# Patient Record
Sex: MALE | Race: WHITE | NOT HISPANIC OR LATINO | Employment: OTHER | URBAN - NONMETROPOLITAN AREA
[De-identification: names, ages, dates, MRNs, and addresses within clinical notes are randomized per-mention and may not be internally consistent; named-entity substitution may affect disease eponyms.]

---

## 2024-09-30 ENCOUNTER — APPOINTMENT (OUTPATIENT)
Dept: GENERAL RADIOLOGY | Facility: HOSPITAL | Age: 79
End: 2024-09-30
Payer: MEDICARE

## 2024-09-30 ENCOUNTER — HOSPITAL ENCOUNTER (INPATIENT)
Facility: HOSPITAL | Age: 79
LOS: 2 days | Discharge: HOME | End: 2024-10-02
Attending: EMERGENCY MEDICINE | Admitting: STUDENT IN AN ORGANIZED HEALTH CARE EDUCATION/TRAINING PROGRAM
Payer: MEDICARE

## 2024-09-30 DIAGNOSIS — R00.1 SYMPTOMATIC BRADYCARDIA: Primary | ICD-10-CM

## 2024-09-30 DIAGNOSIS — I44.2 COMPLETE HEART BLOCK: ICD-10-CM

## 2024-09-30 DIAGNOSIS — I45.9 HEART BLOCK: ICD-10-CM

## 2024-09-30 DIAGNOSIS — G89.18 POST-OP PAIN: ICD-10-CM

## 2024-09-30 LAB
ALBUMIN SERPL-MCNC: 4 G/DL (ref 3.5–5.2)
ALBUMIN/GLOB SERPL: 1.3 G/DL
ALP SERPL-CCNC: 73 U/L (ref 39–117)
ALT SERPL W P-5'-P-CCNC: 22 U/L (ref 1–41)
ANION GAP SERPL CALCULATED.3IONS-SCNC: 10 MMOL/L (ref 5–15)
APTT PPP: 25.9 SECONDS (ref 24.5–36)
AST SERPL-CCNC: 22 U/L (ref 1–40)
BASOPHILS # BLD AUTO: 0.03 10*3/MM3 (ref 0–0.2)
BASOPHILS NFR BLD AUTO: 0.3 % (ref 0–1.5)
BILIRUB SERPL-MCNC: 0.6 MG/DL (ref 0–1.2)
BUN SERPL-MCNC: 17 MG/DL (ref 8–23)
BUN/CREAT SERPL: 20.5 (ref 7–25)
CALCIUM SPEC-SCNC: 9.4 MG/DL (ref 8.6–10.5)
CHLORIDE SERPL-SCNC: 104 MMOL/L (ref 98–107)
CO2 SERPL-SCNC: 26 MMOL/L (ref 22–29)
CREAT SERPL-MCNC: 0.83 MG/DL (ref 0.76–1.27)
DEPRECATED RDW RBC AUTO: 50.4 FL (ref 37–54)
EGFRCR SERPLBLD CKD-EPI 2021: 90.1 ML/MIN/1.73
EOSINOPHIL # BLD AUTO: 0.08 10*3/MM3 (ref 0–0.4)
EOSINOPHIL NFR BLD AUTO: 0.9 % (ref 0.3–6.2)
ERYTHROCYTE [DISTWIDTH] IN BLOOD BY AUTOMATED COUNT: 14.8 % (ref 12.3–15.4)
GLOBULIN UR ELPH-MCNC: 3.1 GM/DL
GLUCOSE SERPL-MCNC: 99 MG/DL (ref 65–99)
HCT VFR BLD AUTO: 45 % (ref 37.5–51)
HGB BLD-MCNC: 15.2 G/DL (ref 13–17.7)
HOLD SPECIMEN: NORMAL
IMM GRANULOCYTES # BLD AUTO: 0.03 10*3/MM3 (ref 0–0.05)
IMM GRANULOCYTES NFR BLD AUTO: 0.3 % (ref 0–0.5)
INR PPP: 0.95 (ref 0.91–1.09)
LYMPHOCYTES # BLD AUTO: 2.7 10*3/MM3 (ref 0.7–3.1)
LYMPHOCYTES NFR BLD AUTO: 31.4 % (ref 19.6–45.3)
MCH RBC QN AUTO: 31.3 PG (ref 26.6–33)
MCHC RBC AUTO-ENTMCNC: 33.8 G/DL (ref 31.5–35.7)
MCV RBC AUTO: 92.6 FL (ref 79–97)
MONOCYTES # BLD AUTO: 0.7 10*3/MM3 (ref 0.1–0.9)
MONOCYTES NFR BLD AUTO: 8.1 % (ref 5–12)
NEUTROPHILS NFR BLD AUTO: 5.07 10*3/MM3 (ref 1.7–7)
NEUTROPHILS NFR BLD AUTO: 59 % (ref 42.7–76)
NRBC BLD AUTO-RTO: 0 /100 WBC (ref 0–0.2)
PLATELET # BLD AUTO: 144 10*3/MM3 (ref 140–450)
PMV BLD AUTO: 9.6 FL (ref 6–12)
POTASSIUM SERPL-SCNC: 4.1 MMOL/L (ref 3.5–5.2)
PROT SERPL-MCNC: 7.1 G/DL (ref 6–8.5)
PROTHROMBIN TIME: 13.1 SECONDS (ref 11.8–14.8)
QT INTERVAL: 506 MS
QTC INTERVAL: 402 MS
RBC # BLD AUTO: 4.86 10*6/MM3 (ref 4.14–5.8)
SODIUM SERPL-SCNC: 140 MMOL/L (ref 136–145)
TROPONIN T SERPL HS-MCNC: 24 NG/L
TROPONIN T SERPL HS-MCNC: 25 NG/L
TSH SERPL DL<=0.05 MIU/L-ACNC: 1.9 UIU/ML (ref 0.27–4.2)
WBC NRBC COR # BLD AUTO: 8.61 10*3/MM3 (ref 3.4–10.8)
WHOLE BLOOD HOLD COAG: NORMAL
WHOLE BLOOD HOLD SPECIMEN: NORMAL

## 2024-09-30 PROCEDURE — 36415 COLL VENOUS BLD VENIPUNCTURE: CPT | Performed by: STUDENT IN AN ORGANIZED HEALTH CARE EDUCATION/TRAINING PROGRAM

## 2024-09-30 PROCEDURE — 85025 COMPLETE CBC W/AUTO DIFF WBC: CPT | Performed by: EMERGENCY MEDICINE

## 2024-09-30 PROCEDURE — C1894 INTRO/SHEATH, NON-LASER: HCPCS | Performed by: STUDENT IN AN ORGANIZED HEALTH CARE EDUCATION/TRAINING PROGRAM

## 2024-09-30 PROCEDURE — 71045 X-RAY EXAM CHEST 1 VIEW: CPT

## 2024-09-30 PROCEDURE — 25010000002 FENTANYL CITRATE (PF) 50 MCG/ML SOLUTION: Performed by: STUDENT IN AN ORGANIZED HEALTH CARE EDUCATION/TRAINING PROGRAM

## 2024-09-30 PROCEDURE — 99285 EMERGENCY DEPT VISIT HI MDM: CPT

## 2024-09-30 PROCEDURE — 99223 1ST HOSP IP/OBS HIGH 75: CPT | Performed by: STUDENT IN AN ORGANIZED HEALTH CARE EDUCATION/TRAINING PROGRAM

## 2024-09-30 PROCEDURE — 33210 INSERT ELECTRD/PM CATH SNGL: CPT | Performed by: STUDENT IN AN ORGANIZED HEALTH CARE EDUCATION/TRAINING PROGRAM

## 2024-09-30 PROCEDURE — 84443 ASSAY THYROID STIM HORMONE: CPT | Performed by: EMERGENCY MEDICINE

## 2024-09-30 PROCEDURE — 84484 ASSAY OF TROPONIN QUANT: CPT | Performed by: STUDENT IN AN ORGANIZED HEALTH CARE EDUCATION/TRAINING PROGRAM

## 2024-09-30 PROCEDURE — 93005 ELECTROCARDIOGRAM TRACING: CPT | Performed by: EMERGENCY MEDICINE

## 2024-09-30 PROCEDURE — 25010000002 MIDAZOLAM HCL (PF) 5 MG/5ML SOLUTION: Performed by: STUDENT IN AN ORGANIZED HEALTH CARE EDUCATION/TRAINING PROGRAM

## 2024-09-30 PROCEDURE — 85730 THROMBOPLASTIN TIME PARTIAL: CPT | Performed by: EMERGENCY MEDICINE

## 2024-09-30 PROCEDURE — 85610 PROTHROMBIN TIME: CPT | Performed by: EMERGENCY MEDICINE

## 2024-09-30 PROCEDURE — 5A1223Z PERFORMANCE OF CARDIAC PACING, CONTINUOUS: ICD-10-PCS | Performed by: STUDENT IN AN ORGANIZED HEALTH CARE EDUCATION/TRAINING PROGRAM

## 2024-09-30 PROCEDURE — 80053 COMPREHEN METABOLIC PANEL: CPT | Performed by: EMERGENCY MEDICINE

## 2024-09-30 PROCEDURE — 84484 ASSAY OF TROPONIN QUANT: CPT | Performed by: EMERGENCY MEDICINE

## 2024-09-30 PROCEDURE — 93005 ELECTROCARDIOGRAM TRACING: CPT

## 2024-09-30 PROCEDURE — 93005 ELECTROCARDIOGRAM TRACING: CPT | Performed by: STUDENT IN AN ORGANIZED HEALTH CARE EDUCATION/TRAINING PROGRAM

## 2024-09-30 RX ORDER — LOSARTAN POTASSIUM 25 MG/1
25 TABLET ORAL DAILY
COMMUNITY

## 2024-09-30 RX ORDER — ASPIRIN 81 MG/1
81 TABLET ORAL DAILY
Status: DISCONTINUED | OUTPATIENT
Start: 2024-09-30 | End: 2024-10-02 | Stop reason: HOSPADM

## 2024-09-30 RX ORDER — FENTANYL CITRATE 50 UG/ML
INJECTION, SOLUTION INTRAMUSCULAR; INTRAVENOUS
Status: DISCONTINUED | OUTPATIENT
Start: 2024-09-30 | End: 2024-09-30 | Stop reason: HOSPADM

## 2024-09-30 RX ORDER — ASPIRIN 81 MG/1
81 TABLET, CHEWABLE ORAL DAILY
COMMUNITY

## 2024-09-30 RX ORDER — MIDAZOLAM HYDROCHLORIDE 5 MG/5ML
INJECTION, SOLUTION INTRAMUSCULAR; INTRAVENOUS
Status: DISCONTINUED | OUTPATIENT
Start: 2024-09-30 | End: 2024-09-30 | Stop reason: HOSPADM

## 2024-09-30 RX ORDER — LIDOCAINE HYDROCHLORIDE 20 MG/ML
INJECTION, SOLUTION INFILTRATION; PERINEURAL
Status: DISCONTINUED | OUTPATIENT
Start: 2024-09-30 | End: 2024-09-30 | Stop reason: HOSPADM

## 2024-09-30 RX ADMIN — ASPIRIN 81 MG: 81 TABLET, COATED ORAL at 20:04

## 2024-09-30 NOTE — Clinical Note
A sheath was successfully inserted using micropuncture technique with ultrasound guidance into the left subclavian vein. Sheath insertion not delayed. Sheath insertion delayed. Wire retained

## 2024-09-30 NOTE — Clinical Note
A 6 fr sheath was successfully inserted with ultrasound guidance into the right internal jugular vein. Sheath insertion not delayed.

## 2024-09-30 NOTE — Clinical Note
Level of Care: Telemetry [5]   Diagnosis: Complete heart block [097016]   Admitting Physician: SAMEERA BURNS [[id number]]   Attending Physician: SAMEERA BURNS [[id number]]   Isolate for COVID?: No [0]   Certification: I Certify That Inpatient Hospital Services Are Medically Necessary For Greater Than 2 Midnights

## 2024-09-30 NOTE — INTERVAL H&P NOTE
H&P reviewed. The patient was examined and there are no changes to the H&P.      ASA: 4  Mallampati: 3

## 2024-09-30 NOTE — H&P (VIEW-ONLY)
"EP CONSULT NOTE    Subjective        History of Present Illness    EP Problems:  1.  Complete heart block  2.  Sinus node dysfunction    Cardiology Problems:  1.  Renal artery aneurysm  2.  CAD status post PCI  3.  Hypertension  4.  Hyperlipidemia    Medical Problems:  1.  BPH  2.  Hypothyroidism  3.  COPD  4.  Obesity    Ej Almanza is a 78 y.o. male with problem list as above for whom EP is consulted regarding complete heart block.  He recently wore Holter monitor for symptoms of worsening lightheadedness, weakness, fatigue, shortness of breath.  He was found to have complete heart block on the study.  He was advised to come to the ER for evaluation.  In the ER, he was noted to be in complete heart block with a wide ventricular escape.  Given these findings, EP was consulted for further evaluation.    Objective   Vital Signs:  /75   Pulse (!) 39   Temp 98.5 °F (36.9 °C) (Oral)   Resp 14   Ht 182.9 cm (72\")   Wt 107 kg (236 lb 8 oz)   SpO2 95%   BMI 32.08 kg/m²   Estimated body mass index is 32.08 kg/m² as calculated from the following:    Height as of this encounter: 182.9 cm (72\").    Weight as of this encounter: 107 kg (236 lb 8 oz).      Physical Exam  Vitals reviewed.   Constitutional:       Appearance: Normal appearance.   Cardiovascular:      Rate and Rhythm: Bradycardia present. Rhythm irregular.      Pulses: Normal pulses.      Heart sounds: Normal heart sounds.   Pulmonary:      Effort: Pulmonary effort is normal.      Breath sounds: Normal breath sounds.   Musculoskeletal:         General: No swelling.   Neurological:      Mental Status: He is alert and oriented to person, place, and time.   Psychiatric:         Mood and Affect: Mood normal.         Judgment: Judgment normal.          Result Review :  The following data was reviewed by: Bianca Espinal MD on 09/30/2024:  CMP          9/30/2024    13:21   CMP   Glucose 99    BUN 17    Creatinine 0.83    EGFR 90.1    Sodium 140  "   Potassium 4.1    Chloride 104    Calcium 9.4    Total Protein 7.1    Albumin 4.0    Globulin 3.1    Total Bilirubin 0.6    Alkaline Phosphatase 73    AST (SGOT) 22    ALT (SGPT) 22    Albumin/Globulin Ratio 1.3    BUN/Creatinine Ratio 20.5    Anion Gap 10.0      CBC          5/22/2024    06:40 9/3/2024    16:22 9/30/2024    13:21   CBC   WBC 6.8     8.7     8.61    RBC 4.93     4.74     4.86    Hemoglobin 15.2     14.5     15.2    Hematocrit 44.3     43.3     45.0    MCV 89.9     91.4     92.6    MCH 30.8     30.6     31.3    MCHC 34.3     33.5     33.8    RDW 14.7     14.6     14.8    Platelets 165     144     144       Details          This result is from an external source.             TSH          9/30/2024    13:21   TSH   TSH 1.900        Chest x-ray from today: Lungs are clear bilaterally    ECG today: Sinus rhythm, occasional PACs, A-V dissociation with complete heart block, fluctuation in ventricular escape rhythm         Assessment and Plan   Problems:  Complete heart block  Sinus node dysfunction    Ej Almanza is a 78 y.o. male with problem list as above for whom EP is consulted regarding complete heart block, sinus node dysfunction.  He has evidence of both complete heart block and sinus node dysfunction.  No obvious reversible etiology.  Given these findings, a pacemaker is going to be necessary for prevention of worsening symptoms and possible sudden death.  It is not going to be feasible to do this today.  As such, we will plan to put in a temporary pacemaker with plans for permanent pacemaker implant as early as tomorrow.    Plan:  -N.p.o. for temporary pacemaker implant today  -Plan for permanent pacemaker implant later this hospitalization  -ICU admission given temporary pacemaker                  Part of this note may be an electronic transcription/translation of spoken language to printed text using the Dragon Dictation System.

## 2024-09-30 NOTE — ED PROVIDER NOTES
Subjective   History of Present Illness  Patient is a 78-year-old male with a history of coronary artery disease who presents to the ER with generalized weakness and bradycardia.  Patient states he has had generalized weakness for the last several months, progressively worsening.  Patient saw Dr. Stoll and was found to have bradycardia.  He wore a heart monitor for a few weeks and was found to have intermittent third-degree block and an AV block.  Dr. Stoll saw the patient in clinic this morning and sent him here to be admitted for pacemaker.  Patient states he does have some shortness of air with exertion.  He denies any fever, chest pain, abdominal pain, nausea vomiting diarrhea, urinary changes, neurologic changes, cough, syncope.      Review of Systems   HENT: Negative.     Eyes: Negative.    Respiratory:  Positive for shortness of breath.    Cardiovascular: Negative.    Gastrointestinal: Negative.    Endocrine: Negative.    Genitourinary: Negative.    Musculoskeletal: Negative.    Skin: Negative.    Allergic/Immunologic: Negative.    Neurological:  Positive for weakness.   Hematological: Negative.    Psychiatric/Behavioral: Negative.     All other systems reviewed and are negative.      No past medical history on file.    Allergies   Allergen Reactions    Penicillins Hives       No past surgical history on file.    No family history on file.    Social History     Socioeconomic History    Marital status:            Objective   Physical Exam  Vitals and nursing note reviewed.   Constitutional:       Appearance: He is well-developed.   HENT:      Head: Normocephalic and atraumatic.   Eyes:      Conjunctiva/sclera: Conjunctivae normal.      Pupils: Pupils are equal, round, and reactive to light.   Cardiovascular:      Rate and Rhythm: Regular rhythm. Bradycardia present.      Heart sounds: Normal heart sounds.   Pulmonary:      Effort: Pulmonary effort is normal.      Breath sounds: Normal breath sounds.    Abdominal:      Palpations: Abdomen is soft.      Tenderness: There is no abdominal tenderness.   Musculoskeletal:         General: No deformity. Normal range of motion.      Cervical back: Normal range of motion.   Skin:     General: Skin is warm.   Neurological:      Mental Status: He is alert and oriented to person, place, and time.      Sensory: Sensation is intact.      Motor: Motor function is intact.   Psychiatric:         Behavior: Behavior normal.         Procedures           ED Course      EKG as interpreted by me: Idioventricular AV block with PVCs with bradycardia with a rate of 38        [id number] Score: 2                          Lab Results (last 24 hours)       Procedure Component Value Units Date/Time    CBC & Differential [[id number]]  (Normal) Collected: 09/30/24 1321    Specimen: Blood Updated: 09/30/24 1328    Narrative:      The following orders were created for panel order CBC & Differential.  Procedure                               Abnormality         Status                     ---------                               -----------         ------                     CBC Auto Differential[[id number]]        Normal              Final result                 Please view results for these tests on the individual orders.    Comprehensive Metabolic Panel [[id number]] Collected: 09/30/24 1321    Specimen: Blood Updated: 09/30/24 1351     Glucose 99 mg/dL      BUN 17 mg/dL      Creatinine 0.83 mg/dL      Sodium 140 mmol/L      Potassium 4.1 mmol/L      Chloride 104 mmol/L      CO2 26.0 mmol/L      Calcium 9.4 mg/dL      Total Protein 7.1 g/dL      Albumin 4.0 g/dL      ALT (SGPT) 22 U/L      AST (SGOT) 22 U/L      Alkaline Phosphatase 73 U/L      Total Bilirubin 0.6 mg/dL      Globulin 3.1 gm/dL      A/G Ratio 1.3 g/dL      BUN/Creatinine Ratio 20.5     Anion Gap 10.0 mmol/L      eGFR 90.1 mL/min/1.73     Narrative:      GFR Normal >60  Chronic Kidney Disease <60  Kidney Failure <15    The GFR formula  is only valid for adults with stable renal function between ages 18 and 70.    CBC Auto Differential [[id number]]  (Normal) Collected: 09/30/24 1321    Specimen: Blood Updated: 09/30/24 1328     WBC 8.61 10*3/mm3      RBC 4.86 10*6/mm3      Hemoglobin 15.2 g/dL      Hematocrit 45.0 %      MCV 92.6 fL      MCH 31.3 pg      MCHC 33.8 g/dL      RDW 14.8 %      RDW-SD 50.4 fl      MPV 9.6 fL      Platelets 144 10*3/mm3      Neutrophil % 59.0 %      Lymphocyte % 31.4 %      Monocyte % 8.1 %      Eosinophil % 0.9 %      Basophil % 0.3 %      Immature Grans % 0.3 %      Neutrophils, Absolute 5.07 10*3/mm3      Lymphocytes, Absolute 2.70 10*3/mm3      Monocytes, Absolute 0.70 10*3/mm3      Eosinophils, Absolute 0.08 10*3/mm3      Basophils, Absolute 0.03 10*3/mm3      Immature Grans, Absolute 0.03 10*3/mm3      nRBC 0.0 /100 WBC     Single High Sensitivity Troponin T [[id number]]  (Abnormal) Collected: 09/30/24 1321    Specimen: Blood Updated: 09/30/24 1444     HS Troponin T 24 ng/L     Narrative:      High Sensitive Troponin T Reference Range:  <14.0 ng/L- Negative Female for AMI  <22.0 ng/L- Negative Male for AMI  >=14 - Abnormal Female indicating possible myocardial injury.  >=22 - Abnormal Male indicating possible myocardial injury.   Clinicians would have to utilize clinical acumen, EKG, Troponin, and serial changes to determine if it is an Acute Myocardial Infarction or myocardial injury due to an underlying chronic condition.         Protime-INR [[id number]]  (Normal) Collected: 09/30/24 1321    Specimen: Blood Updated: 09/30/24 1355     Protime 13.1 Seconds      INR 0.95    aPTT [[id number]]  (Normal) Collected: 09/30/24 1321    Specimen: Blood Updated: 09/30/24 1355     PTT 25.9 seconds     TSH [[id number]]  (Normal) Collected: 09/30/24 1321    Specimen: Blood Updated: 09/30/24 1411     TSH 1.900 uIU/mL            XR Chest 1 View   Final Result   1. Haziness at the LEFT lateral lung base. Limited evaluation due to    cardiac pacing pad at the LEFT chest.   2. Prominent cardiac silhouette and suspected enlarged aortic arch.           This report was signed and finalized on 9/30/2024 2:27 PM by Dr Erin Ricardo MD.                     Medical Decision Making  Patient is a 78-year-old male with a history of coronary artery disease who presents to the ER with generalized weakness and bradycardia.  Patient states he has had generalized weakness for the last several months, progressively worsening.  Patient saw Dr. Stoll and was found to have bradycardia.  He wore a heart monitor for a few weeks and was found to have intermittent third-degree block and an AV block.  Dr. Stoll saw the patient in clinic this morning and sent him here to be admitted for pacemaker.  Patient states he does have some shortness of air with exertion.  He denies any fever, chest pain, abdominal pain, nausea vomiting diarrhea, urinary changes, neurologic changes, cough, syncope.    Differential diagnosis: Bradycardia, heart block, electrolyte abnormality    Dr. Espinal with cardiology was consulted.  Labs are unremarkable except for a mildly elevated troponin.  Chest x-ray shows some haziness at the left lateral lung base and a prominent cardiac silhouette and suspected enlarged aortic arch but were otherwise negative.  Dr. Espinal has seen the patient and has admitted the patient to his service for further workup and treatment.  He plans to place a temporary pacer tonight.      Problems Addressed:  Heart block: complicated acute illness or injury  Symptomatic bradycardia: complicated acute illness or injury    Amount and/or Complexity of Data Reviewed  Labs: ordered. Decision-making details documented in ED Course.  Radiology: ordered. Decision-making details documented in ED Course.  ECG/medicine tests: ordered. Decision-making details documented in ED Course.  Discussion of management or test interpretation with external provider(s): Dr. Espinal with  cardiology    Risk  Decision regarding hospitalization.        Final diagnoses:   Symptomatic bradycardia   Heart block       ED Disposition  ED Disposition       ED Disposition   Decision to Admit    Condition   --    Comment   Level of Care: Telemetry [5]   Diagnosis: Complete heart block [558629]   Admitting Physician: SAMEERA BURNS [[id number]]   Attending Physician: SAMEERA BURNS [[id number]]   Isolate for COVID?: No [0]   Certification: I Certify That Inpatient Hospital Services Are Medically Necessary For Greater Than 2 Midnights                 No follow-up provider specified.       Medication List      No changes were made to your prescriptions during this visit.            Erin Broussard MD  09/30/24 [id number]

## 2024-09-30 NOTE — Clinical Note
Right IJ access. Temporary pacing wires in  place. Sutured in place with bioguard and covered with tegaderm

## 2024-10-01 ENCOUNTER — APPOINTMENT (OUTPATIENT)
Dept: GENERAL RADIOLOGY | Facility: HOSPITAL | Age: 79
End: 2024-10-01
Payer: MEDICARE

## 2024-10-01 LAB
ALBUMIN SERPL-MCNC: 3.7 G/DL (ref 3.5–5.2)
ALBUMIN/GLOB SERPL: 1.3 G/DL
ALP SERPL-CCNC: 71 U/L (ref 39–117)
ALT SERPL W P-5'-P-CCNC: 19 U/L (ref 1–41)
ANION GAP SERPL CALCULATED.3IONS-SCNC: 11 MMOL/L (ref 5–15)
ANION GAP SERPL CALCULATED.3IONS-SCNC: 13 MMOL/L (ref 5–15)
AST SERPL-CCNC: 20 U/L (ref 1–40)
BILIRUB SERPL-MCNC: 0.8 MG/DL (ref 0–1.2)
BUN SERPL-MCNC: 16 MG/DL (ref 8–23)
BUN SERPL-MCNC: 16 MG/DL (ref 8–23)
BUN/CREAT SERPL: 21.1 (ref 7–25)
BUN/CREAT SERPL: 21.6 (ref 7–25)
CALCIUM SPEC-SCNC: 8.7 MG/DL (ref 8.6–10.5)
CALCIUM SPEC-SCNC: 8.8 MG/DL (ref 8.6–10.5)
CHLORIDE SERPL-SCNC: 105 MMOL/L (ref 98–107)
CHLORIDE SERPL-SCNC: 105 MMOL/L (ref 98–107)
CO2 SERPL-SCNC: 22 MMOL/L (ref 22–29)
CO2 SERPL-SCNC: 25 MMOL/L (ref 22–29)
CREAT SERPL-MCNC: 0.74 MG/DL (ref 0.76–1.27)
CREAT SERPL-MCNC: 0.76 MG/DL (ref 0.76–1.27)
DEPRECATED RDW RBC AUTO: 49.6 FL (ref 37–54)
DEPRECATED RDW RBC AUTO: 49.7 FL (ref 37–54)
EGFRCR SERPLBLD CKD-EPI 2021: 92.6 ML/MIN/1.73
EGFRCR SERPLBLD CKD-EPI 2021: 93.3 ML/MIN/1.73
ERYTHROCYTE [DISTWIDTH] IN BLOOD BY AUTOMATED COUNT: 14.7 % (ref 12.3–15.4)
ERYTHROCYTE [DISTWIDTH] IN BLOOD BY AUTOMATED COUNT: 14.8 % (ref 12.3–15.4)
GLOBULIN UR ELPH-MCNC: 2.8 GM/DL
GLUCOSE SERPL-MCNC: 100 MG/DL (ref 65–99)
GLUCOSE SERPL-MCNC: 97 MG/DL (ref 65–99)
HCT VFR BLD AUTO: 43.5 % (ref 37.5–51)
HCT VFR BLD AUTO: 44.6 % (ref 37.5–51)
HGB BLD-MCNC: 14.6 G/DL (ref 13–17.7)
HGB BLD-MCNC: 15.3 G/DL (ref 13–17.7)
INR PPP: 1.02 (ref 0.91–1.09)
MAGNESIUM SERPL-MCNC: 1.9 MG/DL (ref 1.6–2.4)
MCH RBC QN AUTO: 30.7 PG (ref 26.6–33)
MCH RBC QN AUTO: 31.4 PG (ref 26.6–33)
MCHC RBC AUTO-ENTMCNC: 33.6 G/DL (ref 31.5–35.7)
MCHC RBC AUTO-ENTMCNC: 34.3 G/DL (ref 31.5–35.7)
MCV RBC AUTO: 91.4 FL (ref 79–97)
MCV RBC AUTO: 91.4 FL (ref 79–97)
PLATELET # BLD AUTO: 136 10*3/MM3 (ref 140–450)
PLATELET # BLD AUTO: 137 10*3/MM3 (ref 140–450)
PMV BLD AUTO: 10.3 FL (ref 6–12)
PMV BLD AUTO: 10.6 FL (ref 6–12)
POTASSIUM SERPL-SCNC: 4 MMOL/L (ref 3.5–5.2)
POTASSIUM SERPL-SCNC: 4.2 MMOL/L (ref 3.5–5.2)
PROT SERPL-MCNC: 6.5 G/DL (ref 6–8.5)
PROTHROMBIN TIME: 13.8 SECONDS (ref 11.8–14.8)
QT INTERVAL: 500 MS
QTC INTERVAL: 500 MS
RBC # BLD AUTO: 4.76 10*6/MM3 (ref 4.14–5.8)
RBC # BLD AUTO: 4.88 10*6/MM3 (ref 4.14–5.8)
SODIUM SERPL-SCNC: 140 MMOL/L (ref 136–145)
SODIUM SERPL-SCNC: 141 MMOL/L (ref 136–145)
WBC NRBC COR # BLD AUTO: 8.35 10*3/MM3 (ref 3.4–10.8)
WBC NRBC COR # BLD AUTO: 8.39 10*3/MM3 (ref 3.4–10.8)

## 2024-10-01 PROCEDURE — 25010000002 LIDOCAINE 2% SOLUTION: Performed by: STUDENT IN AN ORGANIZED HEALTH CARE EDUCATION/TRAINING PROGRAM

## 2024-10-01 PROCEDURE — 02HK3JZ INSERTION OF PACEMAKER LEAD INTO RIGHT VENTRICLE, PERCUTANEOUS APPROACH: ICD-10-PCS | Performed by: STUDENT IN AN ORGANIZED HEALTH CARE EDUCATION/TRAINING PROGRAM

## 2024-10-01 PROCEDURE — 71045 X-RAY EXAM CHEST 1 VIEW: CPT

## 2024-10-01 PROCEDURE — 25010000002 MIDAZOLAM HCL (PF) 5 MG/5ML SOLUTION: Performed by: STUDENT IN AN ORGANIZED HEALTH CARE EDUCATION/TRAINING PROGRAM

## 2024-10-01 PROCEDURE — S0260 H&P FOR SURGERY: HCPCS | Performed by: STUDENT IN AN ORGANIZED HEALTH CARE EDUCATION/TRAINING PROGRAM

## 2024-10-01 PROCEDURE — 0JH606Z INSERTION OF PACEMAKER, DUAL CHAMBER INTO CHEST SUBCUTANEOUS TISSUE AND FASCIA, OPEN APPROACH: ICD-10-PCS | Performed by: STUDENT IN AN ORGANIZED HEALTH CARE EDUCATION/TRAINING PROGRAM

## 2024-10-01 PROCEDURE — 93005 ELECTROCARDIOGRAM TRACING: CPT | Performed by: STUDENT IN AN ORGANIZED HEALTH CARE EDUCATION/TRAINING PROGRAM

## 2024-10-01 PROCEDURE — 33208 INSRT HEART PM ATRIAL & VENT: CPT | Performed by: STUDENT IN AN ORGANIZED HEALTH CARE EDUCATION/TRAINING PROGRAM

## 2024-10-01 PROCEDURE — 99152 MOD SED SAME PHYS/QHP 5/>YRS: CPT | Performed by: STUDENT IN AN ORGANIZED HEALTH CARE EDUCATION/TRAINING PROGRAM

## 2024-10-01 PROCEDURE — 85610 PROTHROMBIN TIME: CPT | Performed by: STUDENT IN AN ORGANIZED HEALTH CARE EDUCATION/TRAINING PROGRAM

## 2024-10-01 PROCEDURE — 25010000002 CLINDAMYCIN 600 MG/50ML SOLUTION: Performed by: STUDENT IN AN ORGANIZED HEALTH CARE EDUCATION/TRAINING PROGRAM

## 2024-10-01 PROCEDURE — C1887 CATHETER, GUIDING: HCPCS | Performed by: STUDENT IN AN ORGANIZED HEALTH CARE EDUCATION/TRAINING PROGRAM

## 2024-10-01 PROCEDURE — 80053 COMPREHEN METABOLIC PANEL: CPT | Performed by: STUDENT IN AN ORGANIZED HEALTH CARE EDUCATION/TRAINING PROGRAM

## 2024-10-01 PROCEDURE — 25010000002 VANCOMYCIN 10 G RECONSTITUTED SOLUTION: Performed by: STUDENT IN AN ORGANIZED HEALTH CARE EDUCATION/TRAINING PROGRAM

## 2024-10-01 PROCEDURE — 83735 ASSAY OF MAGNESIUM: CPT | Performed by: STUDENT IN AN ORGANIZED HEALTH CARE EDUCATION/TRAINING PROGRAM

## 2024-10-01 PROCEDURE — 99153 MOD SED SAME PHYS/QHP EA: CPT | Performed by: STUDENT IN AN ORGANIZED HEALTH CARE EDUCATION/TRAINING PROGRAM

## 2024-10-01 PROCEDURE — 02H63JZ INSERTION OF PACEMAKER LEAD INTO RIGHT ATRIUM, PERCUTANEOUS APPROACH: ICD-10-PCS | Performed by: STUDENT IN AN ORGANIZED HEALTH CARE EDUCATION/TRAINING PROGRAM

## 2024-10-01 PROCEDURE — 85027 COMPLETE CBC AUTOMATED: CPT | Performed by: STUDENT IN AN ORGANIZED HEALTH CARE EDUCATION/TRAINING PROGRAM

## 2024-10-01 PROCEDURE — C1898 LEAD, PMKR, OTHER THAN TRANS: HCPCS | Performed by: STUDENT IN AN ORGANIZED HEALTH CARE EDUCATION/TRAINING PROGRAM

## 2024-10-01 PROCEDURE — C1892 INTRO/SHEATH,FIXED,PEEL-AWAY: HCPCS | Performed by: STUDENT IN AN ORGANIZED HEALTH CARE EDUCATION/TRAINING PROGRAM

## 2024-10-01 PROCEDURE — 25810000003 SODIUM CHLORIDE 0.9 % SOLUTION: Performed by: STUDENT IN AN ORGANIZED HEALTH CARE EDUCATION/TRAINING PROGRAM

## 2024-10-01 PROCEDURE — C1785 PMKR, DUAL, RATE-RESP: HCPCS | Performed by: STUDENT IN AN ORGANIZED HEALTH CARE EDUCATION/TRAINING PROGRAM

## 2024-10-01 PROCEDURE — 93010 ELECTROCARDIOGRAM REPORT: CPT | Performed by: INTERNAL MEDICINE

## 2024-10-01 PROCEDURE — 25010000002 FENTANYL CITRATE (PF) 50 MCG/ML SOLUTION: Performed by: STUDENT IN AN ORGANIZED HEALTH CARE EDUCATION/TRAINING PROGRAM

## 2024-10-01 DEVICE — GEN PM AZURE XT SURESCAN DR MRI: Type: IMPLANTABLE DEVICE | Site: CHEST | Status: FUNCTIONAL

## 2024-10-01 DEVICE — IMPLANTABLE DEVICE: Type: IMPLANTABLE DEVICE | Site: CHEST | Status: FUNCTIONAL

## 2024-10-01 DEVICE — LD PM CAPSUREFIX NOVUS5076 52CM: Type: IMPLANTABLE DEVICE | Site: CHEST | Status: FUNCTIONAL

## 2024-10-01 RX ORDER — ACETAMINOPHEN 500 MG
500 TABLET ORAL DAILY PRN
COMMUNITY

## 2024-10-01 RX ORDER — SODIUM CHLORIDE 0.9 % (FLUSH) 0.9 %
10 SYRINGE (ML) INJECTION AS NEEDED
Status: DISCONTINUED | OUTPATIENT
Start: 2024-10-01 | End: 2024-10-01

## 2024-10-01 RX ORDER — ROSUVASTATIN CALCIUM 10 MG/1
20 TABLET, COATED ORAL DAILY
Status: DISCONTINUED | OUTPATIENT
Start: 2024-10-01 | End: 2024-10-02 | Stop reason: HOSPADM

## 2024-10-01 RX ORDER — LEVOTHYROXINE SODIUM 25 UG/1
25 TABLET ORAL
Status: DISCONTINUED | OUTPATIENT
Start: 2024-10-01 | End: 2024-10-02 | Stop reason: HOSPADM

## 2024-10-01 RX ORDER — VANCOMYCIN/0.9 % SOD CHLORIDE 1.5G/250ML
15 PLASTIC BAG, INJECTION (ML) INTRAVENOUS ONCE
Status: COMPLETED | OUTPATIENT
Start: 2024-10-01 | End: 2024-10-01

## 2024-10-01 RX ORDER — HYDROCHLOROTHIAZIDE 12.5 MG/1
12.5 TABLET ORAL DAILY
COMMUNITY

## 2024-10-01 RX ORDER — CLINDAMYCIN PHOSPHATE 600 MG/50ML
600 INJECTION, SOLUTION INTRAVENOUS ONCE
Status: COMPLETED | OUTPATIENT
Start: 2024-10-01 | End: 2024-10-01

## 2024-10-01 RX ORDER — FENTANYL CITRATE 50 UG/ML
INJECTION, SOLUTION INTRAMUSCULAR; INTRAVENOUS
Status: DISCONTINUED | OUTPATIENT
Start: 2024-10-01 | End: 2024-10-01 | Stop reason: HOSPADM

## 2024-10-01 RX ORDER — TAMSULOSIN HYDROCHLORIDE 0.4 MG/1
0.4 CAPSULE ORAL EVERY OTHER DAY
Status: DISCONTINUED | OUTPATIENT
Start: 2024-10-02 | End: 2024-10-02 | Stop reason: HOSPADM

## 2024-10-01 RX ORDER — MIDAZOLAM HYDROCHLORIDE 5 MG/5ML
INJECTION, SOLUTION INTRAMUSCULAR; INTRAVENOUS
Status: DISCONTINUED | OUTPATIENT
Start: 2024-10-01 | End: 2024-10-01 | Stop reason: HOSPADM

## 2024-10-01 RX ORDER — CHLORHEXIDINE GLUCONATE 500 MG/1
1 CLOTH TOPICAL ONCE
Status: COMPLETED | OUTPATIENT
Start: 2024-10-01 | End: 2024-10-01

## 2024-10-01 RX ORDER — LIDOCAINE HYDROCHLORIDE 20 MG/ML
INJECTION, SOLUTION INFILTRATION; PERINEURAL
Status: DISCONTINUED | OUTPATIENT
Start: 2024-10-01 | End: 2024-10-01 | Stop reason: HOSPADM

## 2024-10-01 RX ORDER — HYDROCHLOROTHIAZIDE 25 MG/1
12.5 TABLET ORAL DAILY
Status: DISCONTINUED | OUTPATIENT
Start: 2024-10-01 | End: 2024-10-02 | Stop reason: HOSPADM

## 2024-10-01 RX ORDER — HYDROCODONE BITARTRATE AND ACETAMINOPHEN 7.5; 325 MG/1; MG/1
1 TABLET ORAL EVERY 6 HOURS PRN
Status: DISCONTINUED | OUTPATIENT
Start: 2024-10-01 | End: 2024-10-02 | Stop reason: HOSPADM

## 2024-10-01 RX ORDER — SODIUM CHLORIDE 9 MG/ML
40 INJECTION, SOLUTION INTRAVENOUS AS NEEDED
Status: DISCONTINUED | OUTPATIENT
Start: 2024-10-01 | End: 2024-10-01

## 2024-10-01 RX ORDER — CHOLECALCIFEROL (VITAMIN D3) 25 MCG
2000 TABLET ORAL DAILY
COMMUNITY

## 2024-10-01 RX ORDER — SODIUM CHLORIDE 0.9 % (FLUSH) 0.9 %
10 SYRINGE (ML) INJECTION EVERY 12 HOURS SCHEDULED
Status: DISCONTINUED | OUTPATIENT
Start: 2024-10-01 | End: 2024-10-01

## 2024-10-01 RX ORDER — BUDESONIDE 0.5 MG/2ML
0.5 INHALANT ORAL
COMMUNITY

## 2024-10-01 RX ORDER — CHLORHEXIDINE GLUCONATE 500 MG/1
1 CLOTH TOPICAL EVERY 24 HOURS
Status: DISCONTINUED | OUTPATIENT
Start: 2024-10-02 | End: 2024-10-02 | Stop reason: HOSPADM

## 2024-10-01 RX ORDER — LEVOTHYROXINE SODIUM 25 UG/1
25 TABLET ORAL
COMMUNITY

## 2024-10-01 RX ORDER — ROSUVASTATIN CALCIUM 20 MG/1
20 TABLET, COATED ORAL DAILY
COMMUNITY

## 2024-10-01 RX ORDER — TAMSULOSIN HYDROCHLORIDE 0.4 MG/1
1 CAPSULE ORAL EVERY OTHER DAY
COMMUNITY

## 2024-10-01 RX ORDER — LOSARTAN POTASSIUM 25 MG/1
25 TABLET ORAL
Status: DISCONTINUED | OUTPATIENT
Start: 2024-10-01 | End: 2024-10-02 | Stop reason: HOSPADM

## 2024-10-01 RX ADMIN — VANCOMYCIN HYDROCHLORIDE 1500 MG: 10 INJECTION, POWDER, LYOPHILIZED, FOR SOLUTION INTRAVENOUS at 16:17

## 2024-10-01 RX ADMIN — LEVOTHYROXINE SODIUM 25 MCG: 25 TABLET ORAL at 10:30

## 2024-10-01 RX ADMIN — ROSUVASTATIN 20 MG: 10 TABLET, FILM COATED ORAL at 10:30

## 2024-10-01 RX ADMIN — MUPIROCIN 1 APPLICATION: 20 OINTMENT TOPICAL at 20:05

## 2024-10-01 RX ADMIN — LOSARTAN POTASSIUM 25 MG: 25 TABLET, FILM COATED ORAL at 10:30

## 2024-10-01 RX ADMIN — ASPIRIN 81 MG: 81 TABLET, COATED ORAL at 10:30

## 2024-10-01 RX ADMIN — CHLORHEXIDINE GLUCONATE 1 APPLICATION: 500 CLOTH TOPICAL at 10:31

## 2024-10-01 RX ADMIN — CLINDAMYCIN PHOSPHATE 600 MG: 600 INJECTION, SOLUTION INTRAVENOUS at 16:17

## 2024-10-01 RX ADMIN — MUPIROCIN 1 APPLICATION: 20 OINTMENT TOPICAL at 10:30

## 2024-10-01 NOTE — PROGRESS NOTES
"EP Problems:  1.  Complete heart block  2.  Sinus node dysfunction     Cardiology Problems:  1.  Renal artery aneurysm  2.  CAD status post PCI  3.  Hypertension  4.  Hyperlipidemia     Medical Problems:  1.  BPH  2.  Hypothyroidism  3.  COPD  4.  Obesity    Patient ID:  Ej Almanza is a 78 y.o. male with problem list as above as above who EP is following for complete heart block, sinus node dysfunction.    Subjective:  Remains in complete heart block.  Temporary wire placed yesterday afternoon.  Pacing nearly 100% of the time.    Objective:  /78   Pulse 60   Temp 97.6 °F (36.4 °C) (Oral)   Resp 18   Ht 182.9 cm (72\")   Wt 104 kg (228 lb 2.8 oz)   SpO2 92%   BMI 30.95 kg/m²     Well-appearing, no acute distress, alert  Temporary pacemaker in right IJ  Clear to auscultation bilaterally  Regular rate and rhythm  Warm, well-perfused    ASA: 4  Mallampati 3    Labs today:  Lab Results   Component Value Date    GLUCOSE 99 09/30/2024    CALCIUM 9.4 09/30/2024     09/30/2024    K 4.1 09/30/2024    CO2 26.0 09/30/2024    BUN 17 09/30/2024    CREATININE 0.83 09/30/2024    EGFR 90.1 09/30/2024     Lab Results   Component Value Date    WBC 8.61 09/30/2024    HGB 15.2 09/30/2024    HCT 45.0 09/30/2024     09/30/2024     Telemetry: Remains in a paced rhythm, complete heart block    Assessment:  Complete heart block  Sinus node dysfunction  CAD  Essential hypertension    Plan:  -Plan for dual-chamber permanent pacemaker implant today  -Continue aspirin given history of CAD  -Continue losartan for hypertension    Part of this note may be an electronic transcription/translation of spoken language to printed text using the Dragon Dictation System.    "

## 2024-10-01 NOTE — PLAN OF CARE
Goal Outcome Evaluation:  Plan of Care Reviewed With: patient        Progress: improving  Outcome Evaluation: Pt A/O x 4. Afebrile. Pt paced at 60. EKG completed post cath lab. Adequate urine output. NPO since midnight. CHG bath completed. Plan for permanent placemaker.

## 2024-10-01 NOTE — PLAN OF CARE
Goal Outcome Evaluation:  Plan of Care Reviewed With: patient        Progress: improving  Outcome Evaluation: Patient recieved from cath lab. Patient v paced at 60 feels better.  Alert and oriented x4.

## 2024-10-01 NOTE — NURSING NOTE
[facility]  INPATIENT WOUND & OSTOMY CARE    Today's Date: 10/01/24    Patient Name: Ej Almanza  MRN: [id number]  CSN: [id number]  PCP: Ej Tapia MD  Attending Provider: Bianca Espinal MD  Length of Stay: 1    I placed pressure injury prevention measures orders per protocol due to patient being at risk for skin breakdown and being admitted to the unit.     Apply silicone foam border dressing per protocol to sacral spine/bilateral heels for protection.  Nursing to change dressing every 3 days and PRN if soiled. Nursing is to peel back dressing with every assessment to assess skin underneath dressing. No barrier cream under dressing.    Please reach out to wound care nurse if any skin issues arise.     This document has been electronically signed by Fernanda Mullen RN on 10/1/2024 07:07 CDT

## 2024-10-01 NOTE — CASE MANAGEMENT/SOCIAL WORK
Discharge Planning Assessment  [facility]     Patient Name: Ej Almanza  MRN: [id number]  Today's Date: 10/1/2024    Admit Date: 9/30/2024        Discharge Needs Assessment       Row Name 10/01/24 1013       Living Environment    People in Home spouse    Name(s) of People in Home Carrie Almanza (Spouse)  [phone] (Mobile)    Current Living Arrangements home    Potentially Unsafe Housing Conditions none    In the past 12 months has the electric, gas, oil, or water company threatened to shut off services in your home? No    Primary Care Provided by self    Provides Primary Care For no one    Family Caregiver if Needed spouse    Quality of Family Relationships helpful;involved;supportive    Able to Return to Prior Arrangements yes       Resource/Environmental Concerns    Resource/Environmental Concerns none    Transportation Concerns none       Transportation Needs    In the past 12 months, has lack of transportation kept you from medical appointments or from getting medications? no    In the past 12 months, has lack of transportation kept you from meetings, work, or from getting things needed for daily living? No       Food Insecurity    Within the past 12 months, you worried that your food would run out before you got the money to buy more. Never true    Within the past 12 months, the food you bought just didn't last and you didn't have money to get more. Never true       Transition Planning    Patient/Family Anticipates Transition to home with family    Patient/Family Anticipated Services at Transition none    Transportation Anticipated family or friend will provide       Discharge Needs Assessment    Equipment Currently Used at Home none    Concerns to be Addressed denies needs/concerns at this time    Anticipated Changes Related to Illness none    Equipment Needed After Discharge none    Discharge Coordination/Progress Patient lives independently at home with his wife.  No DME in use. Has [facility] coverage and  PCP is Dr. Ej Tapia.  States he doesn't think he needs anything.  Plan is permanent pacemeker placement this afternoon, and home tomorrow if stable and recovery is uneventful. CM/SS available for any needs that may arise.                   Discharge Plan    No documentation.                 Continued Care and Services - Admitted Since 9/30/2024    No active coordination exists for this encounter.          Demographic Summary    No documentation.                  Functional Status    No documentation.                  Psychosocial    No documentation.                  Abuse/Neglect    No documentation.                  Legal    No documentation.                  Substance Abuse    No documentation.                  Patient Forms    No documentation.                     Ladan Anna RN

## 2024-10-02 VITALS
BODY MASS INDEX: 31.03 KG/M2 | HEART RATE: 68 BPM | DIASTOLIC BLOOD PRESSURE: 76 MMHG | HEIGHT: 72 IN | OXYGEN SATURATION: 91 % | WEIGHT: 229.06 LBS | SYSTOLIC BLOOD PRESSURE: 123 MMHG | RESPIRATION RATE: 15 BRPM | TEMPERATURE: 97.5 F

## 2024-10-02 PROCEDURE — 93005 ELECTROCARDIOGRAM TRACING: CPT | Performed by: STUDENT IN AN ORGANIZED HEALTH CARE EDUCATION/TRAINING PROGRAM

## 2024-10-02 PROCEDURE — 99024 POSTOP FOLLOW-UP VISIT: CPT | Performed by: STUDENT IN AN ORGANIZED HEALTH CARE EDUCATION/TRAINING PROGRAM

## 2024-10-02 PROCEDURE — 93010 ELECTROCARDIOGRAM REPORT: CPT | Performed by: INTERNAL MEDICINE

## 2024-10-02 RX ORDER — HYDROCODONE BITARTRATE AND ACETAMINOPHEN 7.5; 325 MG/1; MG/1
1 TABLET ORAL EVERY 6 HOURS PRN
Qty: 12 TABLET | Refills: 0 | Status: SHIPPED | OUTPATIENT
Start: 2024-10-02 | End: 2024-10-06

## 2024-10-02 RX ADMIN — HYDROCHLOROTHIAZIDE 12.5 MG: 25 TABLET ORAL at 08:37

## 2024-10-02 RX ADMIN — LEVOTHYROXINE SODIUM 25 MCG: 25 TABLET ORAL at 05:41

## 2024-10-02 RX ADMIN — ASPIRIN 81 MG: 81 TABLET, COATED ORAL at 08:37

## 2024-10-02 RX ADMIN — TAMSULOSIN HYDROCHLORIDE 0.4 MG: 0.4 CAPSULE ORAL at 08:36

## 2024-10-02 RX ADMIN — CHLORHEXIDINE GLUCONATE 1 APPLICATION: 500 CLOTH TOPICAL at 04:01

## 2024-10-02 RX ADMIN — LOSARTAN POTASSIUM 25 MG: 25 TABLET, FILM COATED ORAL at 08:37

## 2024-10-02 RX ADMIN — HYDROCODONE BITARTRATE AND ACETAMINOPHEN 1 TABLET: 7.5; 325 TABLET ORAL at 04:01

## 2024-10-02 RX ADMIN — MUPIROCIN 1 APPLICATION: 20 OINTMENT TOPICAL at 08:37

## 2024-10-02 RX ADMIN — ROSUVASTATIN 20 MG: 10 TABLET, FILM COATED ORAL at 08:37

## 2024-10-02 NOTE — DISCHARGE SUMMARY
DISCHARGE NOTE    Patient Name: Ej Almanza  Age/Sex: 78 y.o. male  : 1945  MRN: [id number]    Date of Discharge:  10/2/2024   Date of Admit: 2024  Encounter Provider: Bianca Espinal MD  Place of Service: [facility]  Patient Care Team:  Ej Tapia MD as PCP - General (Family Medicine)    Subjective:     Discharge Diagnosis:    Complete heart block    Bradycardia        History of present illness:  EP Problems:  1.  Complete heart block  2.  Sinus node dysfunction     Cardiology Problems:  1.  Renal artery aneurysm  2.  CAD status post PCI  3.  Hypertension  4.  Hyperlipidemia     Medical Problems:  1.  BPH  2.  Hypothyroidism  3.  COPD  4.  Obesity     Ej Almanza is a 78 y.o. male with problem list as above for whom EP is consulted regarding complete heart block.  He recently wore Holter monitor for symptoms of worsening lightheadedness, weakness, fatigue, shortness of breath.  He was found to have complete heart block on the study.  He was advised to come to the ER for evaluation.  In the ER, he was noted to be in complete heart block with a wide ventricular escape.  Given these findings, EP was consulted for further evaluation.       Hospital Course:   He was admitted to the hospital after being found to be in complete heart block upon presentation to the ER.  He underwent temp wire implant that evening.  He underwent dual-chamber permanent pacemaker implant on hospital day 1 with removal of the temporary wire.  He did well overnight without evidence of acute complications and was felt to be safe for discharge home on postoperative day 1.  His chest revealed no evidence of acute complications, his pacemaker interrogation revealed normal device function.    Vital Signs  Temp:  [97.5 °F (36.4 °C)-97.9 °F (36.6 °C)] 97.5 °F (36.4 °C)  Heart Rate:  [60-94] 72  Resp:  [15-24]  15  BP: (110-157)/() 131/94    Intake/Output Summary (Last 24 hours) at 10/2/2024 1027  Last data filed at 10/2/2024 0800  Gross per 24 hour   Intake 840 ml   Output 675 ml   Net 165 ml       Physical Exam:  Physical Exam  Vitals reviewed.   Constitutional:       Appearance: Normal appearance.   Cardiovascular:      Rate and Rhythm: Normal rate and regular rhythm.      Comments: Pulse generator incision site is clean, dry, and intact with steristrips overlying the incision.  There is no evidence of significant swelling, erythema, or tenderness.  Pulmonary:      Effort: Pulmonary effort is normal.      Breath sounds: Normal breath sounds.   Skin:     General: Skin is warm and dry.   Neurological:      General: No focal deficit present.      Mental Status: He is alert and oriented to person, place, and time.   Psychiatric:         Mood and Affect: Mood normal.         Judgment: Judgment normal.          Discharge Diet:   Cardiac diet.    Activity Restrictions at Discharge:   No lifting more than 10lbs for 2 weeks  No lifting more than 20lbs for an additional 2 weeks  Avoid lifting your arm above shoulder level for 4 weeks  Avoid getting your incision wet for 10 days.  You may sponge bathe around it.     Medication changes:  1.  Norco as needed    Discharge Medications     Discharge Medications        New Medications        Instructions Start Date   HYDROcodone-acetaminophen 7.5-325 MG per tablet  Commonly known as: NORCO   1 tablet, Oral, Every 6 Hours PRN             Continue These Medications        Instructions Start Date   acetaminophen 500 MG tablet  Commonly known as: TYLENOL   500 mg, Oral, Daily PRN      aspirin 81 MG chewable tablet   81 mg, Oral, Daily      budesonide 0.5 MG/2ML nebulizer solution  Commonly known as: PULMICORT   0.5 mg, Nebulization, 2 Times Daily - RT      cholecalciferol 25 MCG (1000 UT) tablet  Commonly known as: VITAMIN D3   2,000 Units, Oral, Daily      Diclofenac Sodium 1 % gel  gel  Commonly known as: VOLTAREN   4 g, Topical, 2 Times Daily      hydroCHLOROthiazide 12.5 MG tablet   12.5 mg, Oral, Daily      levothyroxine 25 MCG tablet  Commonly known as: SYNTHROID, LEVOTHROID   25 mcg, Oral, Every Early Morning      losartan 25 MG tablet  Commonly known as: COZAAR   25 mg, Oral, Daily      OSTEO BI-FLEX ONE PER DAY PO   1 tablet, Oral, Daily      rosuvastatin 20 MG tablet  Commonly known as: CRESTOR   20 mg, Oral, Daily      tamsulosin 0.4 MG capsule 24 hr capsule  Commonly known as: FLOMAX   1 capsule, Oral, Every Other Day               Discharge disposition: Home    Follow-up Appointments   Follow-up Information       Ej Tapia MD .    Specialty: Family Medicine  Contact information:  [facility] [id number] [phone]                           No future appointments.      Bianca Espinal MD  10/02/24  10:27 CDT

## 2024-10-02 NOTE — PLAN OF CARE
Goal Outcome Evaluation:  Plan of Care Reviewed With: patient        Progress: improving  Outcome Evaluation: Pt A/O x 4. VSS. Afebrile. 2 L NC applied while sleeping. CXR and EKG completed post permanent pacemaker implant on 10/1. Repeat EKG this morning. Adequate urine output. Prn norco given x 1.

## 2024-10-02 NOTE — DISCHARGE INSTRUCTIONS
Post-Pacemaker/Defibrillator Discharge Instructions     INCISION CARE   Keep your incision dry for 10 days. Take only sponge baths during this time. Do not put salves, ointments or lotions on the incision. Avoid touching the incision or pocket.   Do not use a dressing. Leave the pieces of tape on the incision alone. These will come off by themselves when you begin washing the site.   Call us immediately if you develop a fever of 101 or greater, or if you have any pain, redness, swelling or drainage at the pacemaker site.     REASONS TO GO TO THE EMERGENCY ROOM FOR EVALUATION:   Severe chest pain  Significant shortness of breath at rest  Near passing out or passing out episodes soon after your device implant  Symptoms of chest pain or shortness of breath associated with low blood pressure (reading less than 90 for the top number / systolic blood pressure)  Any concerns that an emergent or life threatening complication is occurring    ACTIVITY   Avoid driving, operating machinery, or alcohol consumption for 24 hours after receiving sedation. In addition, avoid signing legal documents or participating in legal proceedings.   You may use your device arm, but DO NOT raise it higher than your shoulder or reach behind your back for the first two weeks. This is to protect the device lead placement. However, you should use your arm so that the shoulder doesn't get stiff. Also, the use of a sling IS NOT ENCOURAGED due to the potential for the shoulder to become stiff.     Lifting: Do not lift more than 10 pounds for the first 2 weeks and then 20 pounds for the following 2 weeks.     Sports: If you play tennis or golf, avoid full range of motion for your affected arm for 1 month. (A note to hunters: Never fire a rifle or a shotgun on the side of your device).     Driving: To protect your new pacemaker/defibrillator lead(s), it is preferable for you not to drive for one week.     Resuming activities & returning to work: It is  important to resume your normal activities as soon as you feel like it, as long as you do so gradually. Discuss with your doctor a plan for returning to work.     GENERAL REMINDERS     Identification card: Carry your pacemaker/defibrillator I.D. card at all times. A permanent card will be sent to you within 2 months. Call your doctor's office if you do not receive your card or if you should lose it.     Activities to avoid: Arc welding, ham radios and tanning booths can reprogram or interfere with pacemaker/defibrillator function. Strong magnets can cause interference with your device as well.  Do not carry your cell phone in the pocket of your shirt next to your device.    Medical Care: All health care providers should know that you have a pacemaker/defibrillator. Always show your device card to them. Most medical tests and procedures are safe to have (including mammograms, chest x-rays, arteriograms) if you require an MRI please notify your MD.     Anti-theft systems: Also called electronic article surveillance (EAS) systems. These are used in a variety of settings including supermarkets, shopping malls and libraries. They usually consist of one or two columns placed opposite each other near entrances and exits. Walk through the area at a normal pace. Do not stay near the EAS system longer than necessary and do not lean against the system.     Travel and Medical Detectors: It is safe to travel with your pacemaker/defibrillator. Always show your identification card. You may walk through the metal detector if asked to do so, but do not allow the hand held magnetic wand near your device. The  should physically search you instead.     Appliances: Most household appliances cannot harm your device including microwaves. Warnings do not apply to you.     FOLLOW UP   You will follow up with our clinic in approximately 2 weeks after your procedure.  If you do not receive an appointment or you have questions  regarding your appointment, please call your doctor's office.

## 2024-10-03 LAB
QT INTERVAL: 478 MS
QT INTERVAL: 492 MS
QTC INTERVAL: 492 MS
QTC INTERVAL: 508 MS

## 2024-10-04 ENCOUNTER — NURSE TRIAGE (OUTPATIENT)
Dept: CALL CENTER | Facility: HOSPITAL | Age: 79
End: 2024-10-04
Payer: MEDICARE

## 2024-10-04 ENCOUNTER — APPOINTMENT (OUTPATIENT)
Dept: GENERAL RADIOLOGY | Facility: HOSPITAL | Age: 79
End: 2024-10-04
Payer: MEDICARE

## 2024-10-04 ENCOUNTER — HOSPITAL ENCOUNTER (EMERGENCY)
Facility: HOSPITAL | Age: 79
Discharge: HOME | End: 2024-10-04
Attending: EMERGENCY MEDICINE
Payer: MEDICARE

## 2024-10-04 ENCOUNTER — APPOINTMENT (OUTPATIENT)
Dept: CT IMAGING | Facility: HOSPITAL | Age: 79
End: 2024-10-04
Payer: MEDICARE

## 2024-10-04 VITALS
BODY MASS INDEX: 31.02 KG/M2 | TEMPERATURE: 98.7 F | OXYGEN SATURATION: 92 % | RESPIRATION RATE: 18 BRPM | SYSTOLIC BLOOD PRESSURE: 117 MMHG | HEART RATE: 75 BPM | WEIGHT: 229 LBS | HEIGHT: 72 IN | DIASTOLIC BLOOD PRESSURE: 92 MMHG

## 2024-10-04 DIAGNOSIS — H92.02 OTALGIA OF LEFT EAR: ICD-10-CM

## 2024-10-04 DIAGNOSIS — R51.9 NONINTRACTABLE HEADACHE, UNSPECIFIED CHRONICITY PATTERN, UNSPECIFIED HEADACHE TYPE: Primary | ICD-10-CM

## 2024-10-04 LAB
ALBUMIN SERPL-MCNC: 3.7 G/DL (ref 3.5–5.2)
ALBUMIN/GLOB SERPL: 1.1 G/DL
ALP SERPL-CCNC: 85 U/L (ref 39–117)
ALT SERPL W P-5'-P-CCNC: 18 U/L (ref 1–41)
ANION GAP SERPL CALCULATED.3IONS-SCNC: 10 MMOL/L (ref 5–15)
AST SERPL-CCNC: 21 U/L (ref 1–40)
BASOPHILS # BLD AUTO: 0.04 10*3/MM3 (ref 0–0.2)
BASOPHILS NFR BLD AUTO: 0.5 % (ref 0–1.5)
BILIRUB SERPL-MCNC: 0.5 MG/DL (ref 0–1.2)
BUN SERPL-MCNC: 17 MG/DL (ref 8–23)
BUN/CREAT SERPL: 19.1 (ref 7–25)
CALCIUM SPEC-SCNC: 9.1 MG/DL (ref 8.6–10.5)
CHLORIDE SERPL-SCNC: 103 MMOL/L (ref 98–107)
CO2 SERPL-SCNC: 26 MMOL/L (ref 22–29)
CREAT SERPL-MCNC: 0.89 MG/DL (ref 0.76–1.27)
DEPRECATED RDW RBC AUTO: 51.4 FL (ref 37–54)
EGFRCR SERPLBLD CKD-EPI 2021: 88.3 ML/MIN/1.73
EOSINOPHIL # BLD AUTO: 0.13 10*3/MM3 (ref 0–0.4)
EOSINOPHIL NFR BLD AUTO: 1.6 % (ref 0.3–6.2)
ERYTHROCYTE [DISTWIDTH] IN BLOOD BY AUTOMATED COUNT: 15 % (ref 12.3–15.4)
GLOBULIN UR ELPH-MCNC: 3.3 GM/DL
GLUCOSE SERPL-MCNC: 89 MG/DL (ref 65–99)
HCT VFR BLD AUTO: 44.6 % (ref 37.5–51)
HGB BLD-MCNC: 15 G/DL (ref 13–17.7)
IMM GRANULOCYTES # BLD AUTO: 0.02 10*3/MM3 (ref 0–0.05)
IMM GRANULOCYTES NFR BLD AUTO: 0.2 % (ref 0–0.5)
INR PPP: 1.01 (ref 0.91–1.09)
LYMPHOCYTES # BLD AUTO: 2.48 10*3/MM3 (ref 0.7–3.1)
LYMPHOCYTES NFR BLD AUTO: 30.1 % (ref 19.6–45.3)
MAGNESIUM SERPL-MCNC: 1.9 MG/DL (ref 1.6–2.4)
MCH RBC QN AUTO: 31.2 PG (ref 26.6–33)
MCHC RBC AUTO-ENTMCNC: 33.6 G/DL (ref 31.5–35.7)
MCV RBC AUTO: 92.7 FL (ref 79–97)
MONOCYTES # BLD AUTO: 0.88 10*3/MM3 (ref 0.1–0.9)
MONOCYTES NFR BLD AUTO: 10.7 % (ref 5–12)
NEUTROPHILS NFR BLD AUTO: 4.7 10*3/MM3 (ref 1.7–7)
NEUTROPHILS NFR BLD AUTO: 56.9 % (ref 42.7–76)
PLATELET # BLD AUTO: 131 10*3/MM3 (ref 140–450)
PMV BLD AUTO: 9.5 FL (ref 6–12)
POTASSIUM SERPL-SCNC: 4.2 MMOL/L (ref 3.5–5.2)
PROT SERPL-MCNC: 7 G/DL (ref 6–8.5)
PROTHROMBIN TIME: 13.8 SECONDS (ref 11.8–14.8)
QT INTERVAL: 378 MS
QTC INTERVAL: 399 MS
RBC # BLD AUTO: 4.81 10*6/MM3 (ref 4.14–5.8)
SODIUM SERPL-SCNC: 139 MMOL/L (ref 136–145)
WBC NRBC COR # BLD AUTO: 8.25 10*3/MM3 (ref 3.4–10.8)

## 2024-10-04 PROCEDURE — 71045 X-RAY EXAM CHEST 1 VIEW: CPT

## 2024-10-04 PROCEDURE — 85025 COMPLETE CBC W/AUTO DIFF WBC: CPT | Performed by: EMERGENCY MEDICINE

## 2024-10-04 PROCEDURE — 70450 CT HEAD/BRAIN W/O DYE: CPT

## 2024-10-04 PROCEDURE — 99284 EMERGENCY DEPT VISIT MOD MDM: CPT

## 2024-10-04 PROCEDURE — 83735 ASSAY OF MAGNESIUM: CPT | Performed by: EMERGENCY MEDICINE

## 2024-10-04 PROCEDURE — 93005 ELECTROCARDIOGRAM TRACING: CPT | Performed by: EMERGENCY MEDICINE

## 2024-10-04 PROCEDURE — 85610 PROTHROMBIN TIME: CPT | Performed by: EMERGENCY MEDICINE

## 2024-10-04 PROCEDURE — 80053 COMPREHEN METABOLIC PANEL: CPT | Performed by: EMERGENCY MEDICINE

## 2024-10-04 RX ORDER — SODIUM CHLORIDE 0.9 % (FLUSH) 0.9 %
10 SYRINGE (ML) INJECTION AS NEEDED
Status: DISCONTINUED | OUTPATIENT
Start: 2024-10-04 | End: 2024-10-04 | Stop reason: HOSPADM

## 2024-10-04 NOTE — TELEPHONE ENCOUNTER
RN discussed with Dr. Stoll, as patient sees Dr. Stoll at [place].  Per Dr. Stoll, patient needs to be seen at [facility] ED today to rule out RV lead dislodgment/perforation.  RN communicated with patient's wife that Dr. Stoll requested they come to our ED as soon as they can.  RN will notify Dr. Espinal as well.

## 2024-10-04 NOTE — ED PROVIDER NOTES
Subjective   History of Present Illness  78-year-old presents to the ED with complaint of mild headache left-sided neck pain, concern for possible pacemaker dysfunction.  He has a history of complete heart block, underwent recent pacemaker placement 10/1/2024.  Woke up today with complaint of frontal headache going on the left side of his neck.  No chest pain or shortness of breath, no nausea or diaphoresis.  He contacted cardiology office and message got pass along to his cardiologist, Dr. Stoll, recommended patient come to the ED for further evaluation to ensure no lead dislodgment/ventricular perforation.  Patient states he is feeling better by the time he arrived to the ED.    History provided by:  Patient      Review of Systems   All other systems reviewed and are negative.      Past Medical History:   Diagnosis Date    Bradycardia     Disease of thyroid gland     Hyperlipidemia     Hypertension        Allergies   Allergen Reactions    Penicillins Hives       Past Surgical History:   Procedure Laterality Date    CARDIAC CATHETERIZATION      CARDIAC ELECTROPHYSIOLOGY PROCEDURE N/A 9/30/2024    Procedure: Temporary Pacemaker;  Surgeon: Bianca Espinal MD;  Location:  [facility] CATH INVASIVE LOCATION;  Service: Cardiovascular;  Laterality: N/A;    CARDIAC ELECTROPHYSIOLOGY PROCEDURE Left 10/1/2024    Procedure: Pacemaker Dual chamber new;  Surgeon: Bianca Espinal MD;  Location:  [facility] CATH INVASIVE LOCATION;  Service: Cardiovascular;  Laterality: Left;       History reviewed. No pertinent family history.    Social History     Socioeconomic History    Marital status:    Tobacco Use    Smoking status: Never    Smokeless tobacco: Never   Vaping Use    Vaping status: Never Used   Substance and Sexual Activity    Alcohol use: Not Currently    Drug use: Never    Sexual activity: Defer           Objective   Physical Exam  Vitals and nursing note reviewed.   Constitutional:       Appearance: Normal appearance. He is normal  weight.   HENT:      Head: Normocephalic and atraumatic.      Nose: Nose normal. No congestion or rhinorrhea.      Mouth/Throat:      Mouth: Mucous membranes are moist.   Eyes:      Extraocular Movements: Extraocular movements intact.      Conjunctiva/sclera: Conjunctivae normal.      Pupils: Pupils are equal, round, and reactive to light.   Cardiovascular:      Rate and Rhythm: Normal rate and regular rhythm.      Heart sounds: Normal heart sounds. No murmur heard.  Pulmonary:      Effort: Pulmonary effort is normal.      Breath sounds: Normal breath sounds.   Abdominal:      General: Abdomen is flat.      Palpations: Abdomen is soft.   Musculoskeletal:      Right lower leg: No edema.      Left lower leg: No edema.   Skin:     General: Skin is warm.      Capillary Refill: Capillary refill takes less than 2 seconds.      Comments: Skin to the left upper chest wall slightly edematous around pacemaker site.  No erythema, no drainage.  No evidence of cellulitis   Neurological:      General: No focal deficit present.      Mental Status: He is alert and oriented to person, place, and time. Mental status is at baseline.         Procedures         Lab Results (last 24 hours)       Procedure Component Value Units Date/Time    CBC & Differential [[id number]]  (Abnormal) Collected: 10/04/24 1423    Specimen: Blood Updated: 10/04/24 1438    Narrative:      The following orders were created for panel order CBC & Differential.  Procedure                               Abnormality         Status                     ---------                               -----------         ------                     CBC Auto Differential[[id number]]        Abnormal            Final result                 Please view results for these tests on the individual orders.    Comprehensive Metabolic Panel [[id number]] Collected: 10/04/24 1423    Specimen: Blood Updated: 10/04/24 1452     Glucose 89 mg/dL      BUN 17 mg/dL      Creatinine 0.89 mg/dL       Sodium 139 mmol/L      Potassium 4.2 mmol/L      Chloride 103 mmol/L      CO2 26.0 mmol/L      Calcium 9.1 mg/dL      Total Protein 7.0 g/dL      Albumin 3.7 g/dL      ALT (SGPT) 18 U/L      AST (SGOT) 21 U/L      Alkaline Phosphatase 85 U/L      Total Bilirubin 0.5 mg/dL      Globulin 3.3 gm/dL      A/G Ratio 1.1 g/dL      BUN/Creatinine Ratio 19.1     Anion Gap 10.0 mmol/L      eGFR 88.3 mL/min/1.73     Narrative:      GFR Normal >60  Chronic Kidney Disease <60  Kidney Failure <15    The GFR formula is only valid for adults with stable renal function between ages 18 and 70.    Protime-INR [[id number]]  (Normal) Collected: 10/04/24 1423    Specimen: Blood Updated: 10/04/24 1525     Protime 13.8 Seconds      INR 1.01    Magnesium [[id number]]  (Normal) Collected: 10/04/24 1423    Specimen: Blood Updated: 10/04/24 1452     Magnesium 1.9 mg/dL     CBC Auto Differential [[id number]]  (Abnormal) Collected: 10/04/24 1423    Specimen: Blood Updated: 10/04/24 1438     WBC 8.25 10*3/mm3      RBC 4.81 10*6/mm3      Hemoglobin 15.0 g/dL      Hematocrit 44.6 %      MCV 92.7 fL      MCH 31.2 pg      MCHC 33.6 g/dL      RDW 15.0 %      RDW-SD 51.4 fl      MPV 9.5 fL      Platelets 131 10*3/mm3      Neutrophil % 56.9 %      Lymphocyte % 30.1 %      Monocyte % 10.7 %      Eosinophil % 1.6 %      Basophil % 0.5 %      Immature Grans % 0.2 %      Neutrophils, Absolute 4.70 10*3/mm3      Lymphocytes, Absolute 2.48 10*3/mm3      Monocytes, Absolute 0.88 10*3/mm3      Eosinophils, Absolute 0.13 10*3/mm3      Basophils, Absolute 0.04 10*3/mm3      Immature Grans, Absolute 0.02 10*3/mm3          XR Chest 1 View    Result Date: 10/4/2024  EXAM: XR CHEST 1 VW- 10/4/2024 3:33 PM  HISTORY: recent pacemaker placement, neck pain   COMPARISON: 10/1/2024.  TECHNIQUE: Single frontal radiograph of the chest was obtained.  FINDINGS:  Support Devices: Stable cardiac pacemaker device.  Cardiac and Mediastinal Silhouettes: Normal.  Lungs/Pleura: Stable  mild presumed bibasilar atelectasis. No new consolidation. No sizable pleural effusion. No visible pneumothorax.  Osseous structures: No acute osseous finding.  Other: None.       No acute cardiopulmonary abnormality.    This report was signed and finalized on 10/4/2024 4:38 PM by Iain Smith.      CT Head Without Contrast    Result Date: 10/4/2024  EXAM/TECHNIQUE: CT head without contrast  INDICATION: headache, dizziness  COMPARISON: None available.  DLP: 703.48 mGy.cm. Automated exposure control was also utilized to decrease patient radiation dose.  FINDINGS:  No evidence of intracranial hemorrhage. Gray-white differentiation is maintained. Advanced presumed chronic microvascular ischemic white matter change. Mild global cerebral volume loss. No midline shift or mass effect. Lateral ventricles are nondilated. Basilar cisterns are patent. Atherosclerosis. Visualized portion of the orbits markable. Mastoid air cells are clear. Mild diffuse paranasal sinus mucosal thickening. No paranasal sinus air-fluid level. No acute osseous finding.       1.  No acute intracranial findings. 2.  Global cerebral volume loss and presumed chronic microvascular ischemic white matter change.   This report was signed and finalized on 10/4/2024 2:51 PM by Dr. Carl Ferrer MD.      ED Course  ED Course as of 10/04/24 1852   Fri Oct 04, 2024   1809 78-year-old male presents to the ED with headache and left-sided neck pain, underwent recent pacemaker placement a few days ago who was concerned he might be having some complications.  Chest x-ray normal appearing, no evidence of lead fracture.  Has been feeling better since arrival to the emergency department.  Since patient was instructed to present to the ED by cardiology, we will try to get in touch with the EP to confirm no additional testing needed prior to discharge. [AW]   3008 Case discussed with Dr. Espinal with EP, does not feel patient symptoms are related to recent pacemaker  placement.  Chest x-ray unremarkable.  Okay with discharge home.  CT head negative for acute intracranial abnormality [AW]      ED Course User Index  [AW] Kobe Zhu MD                                             Medical Decision Making  Amount and/or Complexity of Data Reviewed  Labs: ordered.  Radiology: ordered.  ECG/medicine tests: ordered.    Risk  Prescription drug management.        Final diagnoses:   Nonintractable headache, unspecified chronicity pattern, unspecified headache type   Otalgia of left ear       ED Disposition  ED Disposition       ED Disposition   Discharge    Condition   Stable    Comment   --               Ej Tapia MD  [address] [address]  [phone]    Schedule an appointment as soon as possible for a visit   As needed         Medication List      No changes were made to your prescriptions during this visit.            Kobe Zhu MD  10/04/24 5229

## 2024-10-04 NOTE — TELEPHONE ENCOUNTER
Pacemaker inserted 10/01/2024  Site left shoulder no drainage no bleeding swelling    Home on 10/02/2024    Ate breakfast this am C/O pressure in ears and jaw, started this am   Rates pain as 6/10  No chest pressure. No SOA. No diaphoresis    Advised to call cardiology if no response within the hour to go to the ED      Reason for Disposition   [1] Caller has URGENT question AND [2] triager unable to answer question    Additional Information   Negative: [1] Major abdominal surgical incision AND [2] wound gaping open AND [3] visible internal organs   Negative: Sounds like a life-threatening emergency to the triager   Negative: Patient has a Negative Pressure Wound Therapy device   Negative: Patient is followed by a wound clinic or wound specialist for this wound   Negative: [1] Bleeding from incision AND [2] won't stop after 10 minutes of direct pressure   Negative: [1] Bleeding (more than a few drops) from incision AND [2] tracheostomy or blood vessel surgery (e.g., carotid endarterectomy, femoral bypass graft, kidney dialysis fistula)   Negative: [1] Widespread rash AND [2] bright red, sunburn-like   Negative: Severe pain in the incision   Negative: [1] Incision gaping open AND [2] < 48 hours since wound re-opened   Negative: [1] Incision gaping open AND [2] length of opening > 2 inches (5 cm)   Negative: Patient sounds very sick or weak to the triager   Negative: Sounds like a serious complication to the triager   Negative: Fever > 100.4 F (38.0 C)   Negative: [1] Incision looks infected (spreading redness, pain) AND [2] fever > 99.5 F (37.5 C)   Negative: [1] Incision looks infected (spreading redness, pain) AND [2] large red area (> 2 in. or 5 cm)   Negative: [1] Incision looks infected (spreading redness, pain) AND [2] face wound   Negative: [1] Red streak runs from the incision AND [2] longer than 1 inch (2.5 cm)   Negative: [1] Pus or bad-smelling fluid draining from incision AND [2] no fever   Negative: [1]  "Post-op pain AND [2] not controlled with pain medications   Negative: Dressing soaked with blood or body fluid (e.g., drainage)   Negative: [1] Scant bleeding (e.g., few drops) from incision AND AND [2] tracheostomy or blood vessel surgery (e.g., carotid endarterectomy, femoral bypass graft, kidney dialysis fistula)   Negative: [1] Raised bruise and [2] size > 2 inches (5 cm) and expanding    Answer Assessment - Initial Assessment Questions  1. SYMPTOM: \"What's the main symptom you're concerned about?\" (e.g., drainage, incision opening up, pain, redness)  Ear and jaw pressure  2. ONSET: \"When did *No Answer*  start?\"  This am  3. SURGERY: \"What surgery did you have?\"      10/01/2024  4. DATE of SURGERY: \"When was the surgery?\"    10/01/2024  5. INCISION SITE: \"Where is the incision located?\"     Left shoulder  6. REDNESS: \"Is there any redness at the incision site?\" If Yes, ask: \"How wide across is the redness?\" (Inches, centimeters)   Denies redness swelling  7. PAIN: \"Is there any pain?\" If Yes, ask: \"How bad is it?\"  (Scale 1-10; or mild, moderate, severe)    - NONE (0): no pain    - MILD (1-3): doesn't interfere with normal activities     - MODERATE (4-7): interferes with normal activities or awakens from sleep     - SEVERE (8-10): excruciating pain, unable to do any normal activities     6/10 pressure to ears jaw  8. BLEEDING: \"Is there any bleeding?\" If Yes, ask: \"How much?\" and \"Where?\"     denies  9. DRAINAGE: \"Is there any drainage from the incision site?\" If Yes, ask: \"What color and how much?\" (e.g., red, cloudy, pus; drops, teaspoon)  No drainage  10. FEVER: \"Do you have a fever?\" If Yes, ask: \"What is your temperature, how was it measured, and when did it start?\"    denies  11. OTHER SYMPTOMS: \"Do you have any other symptoms?\" (e.g., dizziness, rash elsewhere on body, shaking chills, weakness)      Ear pressure jaw pressure    Protocols used: Post-Op Incision Symptoms and Questions-ADULT-    "

## 2024-10-04 NOTE — TELEPHONE ENCOUNTER
NOTIFIED DEVICE RN VIA CHAT - SHE ASKS THAT I OBTAIN RECORDS FROM PATIENTS VISIT FROM WALK-IN CLINIC.    CALL PLACED TO [facility] - SPOKE WITH MILTON & REQUESTED TODAYS OFFICE VISIT BE FAXED TO US.    SHE STATES THAT PATIENT WAS NOT OFFICIALLY SEEN, SO THERE IS NO NOTE. THEIR APRN WAS MADE AWARE OF PATIENTS SYMPTOMS & THEN ADVISED HE BE SEEN BY US.

## 2024-10-04 NOTE — TELEPHONE ENCOUNTER
MILTON FROM [facility] (WALK-IN CLINIC) CALLED TO LET US KNOW THAT PT WAS JUST SEEN BY THEIR APRN, AS ADVISED BY US IN PREVIOUS NOTE.    THE APRN HAS ADVISED THAT PT BE EVALUATED BY CARDIOLOGY/EP AS SOON AS POSSIBLE. SHE REPORTS THAT PT IS COMPLAINING OF PAIN / STIFFNESS ALONG BOTH SIDES OF HIS NECK & FEELS WEAK / FATIGUED.    PATIENTS WIFE WOULD LIKE TO BE CONTACTED [phone]

## 2024-10-04 NOTE — TELEPHONE ENCOUNTER
Per Dr. Espinal, patient advised to contact PCP for evaluation.  RN called patient's PCP's office, Dr. Tapia, at [facility], but no appointments are available today or next week.  RN discussed with patient's wife and they will seek care at local urgent care and will update our clinic with the findings.  RN's direct line provided for questions or concerns.

## 2024-10-17 ENCOUNTER — CLINICAL SUPPORT NO REQUIREMENTS (OUTPATIENT)
Dept: CARDIOLOGY | Facility: CLINIC | Age: 79
End: 2024-10-17
Payer: MEDICARE

## 2024-10-17 DIAGNOSIS — Z95.0 PACEMAKER: Primary | ICD-10-CM

## 2024-10-17 DIAGNOSIS — I44.2 COMPLETE HEART BLOCK: ICD-10-CM

## 2024-10-17 LAB
QT INTERVAL: 378 MS
QT INTERVAL: 500 MS
QT INTERVAL: 506 MS
QTC INTERVAL: 399 MS
QTC INTERVAL: 402 MS
QTC INTERVAL: 500 MS

## 2024-10-17 PROCEDURE — 93280 PM DEVICE PROGR EVAL DUAL: CPT | Performed by: STUDENT IN AN ORGANIZED HEALTH CARE EDUCATION/TRAINING PROGRAM

## 2024-10-17 NOTE — PROGRESS NOTES
Dual Chamber Pacemaker Interrogation Report  IN OFFICE BY COMPANY REP    October 17, 2024    Primary Cardiologist: Kylie  : Zextit Model: [place] XT DR MRI [id number]  Implant date: 10.1.24    Reason for evaluation: New Implant Follow Up  Indication for pacemaker: Complete Heart Block    Interrogation performed by:  JEFF Mota    Incision:  Steri-strips remain intact.  Surrounding skin is without erythema, warmth, open areas, or drainage of any kind.  Mild pocket edema noted.  Denies fevers/chills since date of procedure.    Measurements  Atrial sensing - P wave:  2 mV  Atrial threshold: 1V @ 0.4ms  Atrial lead impedance: 437 ohms  Ventricular sensing - R wave: >20 mV  Ventricular threshold: 0.5 V @ 0.4 ms  Ventricular lead impedance:   722 ohms     Manual sensing and threshold testing performed:  Yes    Diagnostic Data  Atrial paced: 51.8 %  Ventricular paced: 98.5 %    Episodes/Alerts since 10.8.24:  P-AF/AFL, longest duration 25 minutes, average ventricular rates controlled.  [place] 14.4% since device implant.  Patient is not anticoagulated.      Battery status: Satisfactory , estimated 11.2 years remaining      Final Parameters  Mode:  DDDR  Lower rate: 60 bpm   Upper rate: 130 bpm  AV Delay: paced- 180 ms  Sensed-150 ms  Atrial - Amplitude: 3.5 V   Pulse width: 0.4 ms   Sensitivity: 0.3 mV     Ventricular - Amplitude: 3.5 V  Pulse width: 0.4 ms  Sensitivity: 0.9 mV      Changes made: None.    Conclusions: Normal Pacemaker Function, Stable Pacing and Sensing Thresholds, and Adequate Battery Reserve    Remote Monitor:  Yes, connected.  Uses [facility].    Follow up: 11.12.24 in office, plus remotely via [employer]    Final impression:  Data above reviewed.  Agree with findings and conclusions as per the above note.  Reported AF episodes are frequent but relatively short.  Review of the episodes reveals organized atrial activity consistent with extremely frequent PACs.

## 2024-10-17 NOTE — Clinical Note
New implant pacemaker - AF/AFL burden 14.4%, not anticoagulated.  Per patient, no history of abnormal bleeding (GI, brain, ).  Please review and sign.

## 2024-11-12 ENCOUNTER — CLINICAL SUPPORT NO REQUIREMENTS (OUTPATIENT)
Dept: CARDIOLOGY | Facility: CLINIC | Age: 79
End: 2024-11-12
Payer: MEDICARE

## 2024-11-12 ENCOUNTER — OFFICE VISIT (OUTPATIENT)
Dept: CARDIOLOGY | Facility: CLINIC | Age: 79
End: 2024-11-12
Payer: MEDICARE

## 2024-11-12 VITALS
WEIGHT: 233 LBS | SYSTOLIC BLOOD PRESSURE: 136 MMHG | HEIGHT: 72 IN | DIASTOLIC BLOOD PRESSURE: 86 MMHG | OXYGEN SATURATION: 97 % | BODY MASS INDEX: 31.56 KG/M2 | HEART RATE: 63 BPM

## 2024-11-12 DIAGNOSIS — Z95.0 PRESENCE OF CARDIAC PACEMAKER: Primary | ICD-10-CM

## 2024-11-12 DIAGNOSIS — Z95.0 PACEMAKER: Primary | ICD-10-CM

## 2024-11-12 DIAGNOSIS — I44.2 COMPLETE HEART BLOCK: ICD-10-CM

## 2024-11-12 DIAGNOSIS — R00.1 BRADYCARDIA: ICD-10-CM

## 2024-11-12 NOTE — PROGRESS NOTES
"[facility] -  CLINIC FOLLOW UP     Patient Care Team:  Ej Tapia MD as PCP - General (Family Medicine)    Chief Complaint: follow up to PPM     Subjective   EP Problems:  1.  Complete heart block  -1/1/24: Dual chamber Medtronic PPM  2.  Sinus node dysfunction     Cardiology Problems:  1.  Renal artery aneurysm  2.  CAD status post PCI  3.  Hypertension  4.  Hyperlipidemia     Medical Problems:  1.  BPH  2.  Hypothyroidism  3.  COPD  4.  Obesity       HPI: Today I had the pleasure of seeing Ej Almanza in the cardiology clinic for follow up. He is a 79 year old male with a history of complete HB now s/p dual chamber PPM. Since implant, he feels better and has not had any SOB or fatigue like before. He woke up recently with a headache and neck pain and was recommended to go to ER, but ruled out for anything worrisome. His incision is well healed. He had a successful physiologic pacing lead insertion.     On device check today, he was noted to have AF but this appears to be frequent atrial ectopy instead. He is AP63.5%,  97.9% Paced. AFL x 121, longest 25 minutes, average ventricular rates controlled.       Objective     Visit Vitals  /86   Pulse 63   Ht 182.9 cm (72.01\")   Wt 106 kg (233 lb)   SpO2 97%   BMI 31.59 kg/m²           Vitals reviewed.   Constitutional:       Appearance: Healthy appearance. Not in distress.   Eyes:      Extraocular Movements: Extraocular movements intact.      Conjunctiva/sclera: Conjunctivae normal.      Pupils: Pupils are equal, round, and reactive to light.   HENT:      Head: Normocephalic and atraumatic.      Nose: Nose normal.    Mouth/Throat:      Lips: Pink.      Mouth: Mucous membranes are moist.      Pharynx: Oropharynx is clear.   Neck:      Vascular: No carotid bruit or JVD. JVD normal.   Pulmonary:      Effort: Pulmonary effort is normal.      Breath sounds: Normal breath sounds.   Chest:      Chest wall: Not tender to " palpatation.   Cardiovascular:      PMI at left midclavicular line. Normal rate. Irregular rhythm. Normal S1. Normal S2.       Murmurs: There is no murmur.      No gallop.  No rub.   Pulses:     Radial: 2+ bilaterally.  Edema:     Peripheral edema absent.   Abdominal:      General: Bowel sounds are normal.      Palpations: Abdomen is soft.   Musculoskeletal: Normal range of motion.      Extremities: No clubbing present.     Cervical back: Normal range of motion. Skin:     General: Skin is warm and dry.   Neurological:      General: No focal deficit present.      Mental Status: Alert and oriented to person, place, and time.   Psychiatric:         Attention and Perception: Attention normal.         Mood and Affect: Affect normal.         Speech: Speech normal.         Behavior: Behavior normal.         Cognition and Memory: Cognition normal.             The following portions of the patient's history were reviewed and updated as appropriate: allergies, current medications, past medical history, past social history, past and problem list.     Review of Systems   Constitutional: Negative.    HENT: Negative.     Eyes: Negative.    Respiratory: Negative.     Cardiovascular: Negative.    Gastrointestinal: Negative.    Endocrine: Negative.    Genitourinary: Negative.    Musculoskeletal: Negative.    Skin: Negative.    Allergic/Immunologic: Negative.    Neurological: Negative.    Hematological: Negative.    Psychiatric/Behavioral: Negative.           ECG 12 Lead    Date/Time: 11/12/2024 12:01 PM  Performed by: Mackenzie Smith PA    Authorized by: Mackenzie Smith PA  Comparison: compared with previous ECG from 10/4/2024  Rhythm: sinus rhythm  Rate: normal  BPM: 66      CARDIOLOGY VISIT - SCAN - Pacemaker [employer] [facility], Dr. Espinal, 11.12.2024 (11/12/2024)       Medication Review: yes    Current Outpatient Medications:     acetaminophen (TYLENOL) 500 MG tablet, Take 1 tablet by mouth Daily As Needed for Mild Pain., Disp: ,  "Rfl:     aspirin 81 MG chewable tablet, Chew 1 tablet Daily., Disp: , Rfl:     Boswellia-Glucosamine-Vit D (OSTEO BI-FLEX ONE PER DAY PO), Take 1 tablet by mouth Daily., Disp: , Rfl:     budesonide (PULMICORT) 0.5 MG/2ML nebulizer solution, Take 2 mL by nebulization 2 (Two) Times a Day., Disp: , Rfl:     Cholecalciferol 25 MCG (1000 UT) tablet, Take 2 tablets by mouth Daily., Disp: , Rfl:     Diclofenac Sodium (VOLTAREN) 1 % gel gel, Apply 4 g topically to the appropriate area as directed 2 (Two) Times a Day., Disp: , Rfl:     hydroCHLOROthiazide 12.5 MG tablet, Take 1 tablet by mouth Daily., Disp: , Rfl:     levothyroxine (SYNTHROID, LEVOTHROID) 25 MCG tablet, Take 1 tablet by mouth Every Morning., Disp: , Rfl:     losartan (COZAAR) 25 MG tablet, Take 1 tablet by mouth Daily., Disp: , Rfl:     rosuvastatin (CRESTOR) 20 MG tablet, Take 1 tablet by mouth Daily., Disp: , Rfl:     tamsulosin (FLOMAX) 0.4 MG capsule 24 hr capsule, Take 1 capsule by mouth Every Other Day., Disp: , Rfl:    Allergies   Allergen Reactions    Penicillins Hives       I have reviewed       CBC:  Lab Results - Last 18 Months   Lab Units 10/04/24  1423   WBC 10*3/mm3 8.25   HEMOGLOBIN g/dL 15.0   HEMATOCRIT % 44.6   PLATELETS 10*3/mm3 131*      BMP/CMP:  Lab Results - Last 18 Months   Lab Units 10/04/24  1423   SODIUM mmol/L 139   POTASSIUM mmol/L 4.2   CHLORIDE mmol/L 103   CO2 mmol/L 26.0   GLUCOSE mg/dL 89   BUN mg/dL 17   CREATININE mg/dL 0.89   CALCIUM mg/dL 9.1     BNP: No results for input(s): \"PROBNP\" in the last 31702 hours.   THYROID:  Lab Results - Last 18 Months   Lab Units 09/30/24  1321 05/22/24  0640   TSH uIU/mL 1.900  --    FREE T4 NG/DL  --  1.18          Assessment:   Diagnoses and all orders for this visit:    1. Presence of cardiac pacemaker (Primary)  -     ECG 12 Lead; Future    2. Complete heart block    3. Bradycardia    Other orders  -     ECG 12 Lead    Dual chamber MDT PPM: Incision is well healed. Device check today " shows stable functioning device.   -Follow up in 3 months     Atrial ectopy: Listed at atrial flutter, but these are PACs frequently. He is asymptomatic. Labs at the time of implant showed normal Magnesium and potassium.     I spent 30 minutes caring for Ej on this date of service. This time includes time spent by me in the following activities:preparing for the visit, reviewing tests, obtaining and/or reviewing a separately obtained history, performing a medically appropriate examination and/or evaluation , counseling and educating the patient/family/caregiver, ordering medications, tests, or procedures, referring and communicating with other health care professionals , documenting information in the medical record, and independently interpreting results and communicating that information with the patient/family/caregiver        Electronically signed by KAR Hodgson

## 2024-11-12 NOTE — PROGRESS NOTES
Dual Chamber Pacemaker Interrogation Report  IN OFFICE    November 12, 2024    Primary Cardiologist: Kylie  : Medtronic Model: [place]  Implant date: 10.1.24    Reason for evaluation: New Implant Follow Up  Indication for pacemaker: Complete Heart Block    Interrogation performed by:  Barb Dove RN    Incision: Incision is well approximated without erythema, edema, warmth, open areas, or drainage of any kind.  Patient denies fevers/chills since date of procedure.    Measurements  Atrial sensing - P wave: 1.9 mV  Atrial threshold: 0.75V@ 0.4ms  Atrial lead impedance: 456 ohms  Ventricular sensing - R wave: >20 mV  Ventricular threshold: 0.75 V @ 0.4 ms  Ventricular lead impedance:   722 ohms     Manual sensing and threshold testing performed:  Yes    Diagnostic Data  Atrial paced: 63.5 %  Ventricular paced: 97.9 %    Episodes/Alerts since 10.17.24: 121 AT/AF episodes, longest duration 25 minutes, average ventricular rates controlled.  [place] 2.3%, not anticoagulated.  Episodes assessed by Dr. Espinal - Frequent atrial ectopy, not AFL.  Patient reports rare palpitations.  Frequent PACs noted during interrogation as well.    Battery status: Satisfactory , estimated 14.8 years on battery      Final Parameters  Mode:  DDDR  Lower rate: 60 bpm   Upper rate: 130 bpm  AV Delay: paced- 180 ms  Sensed-150 ms  Atrial - Amplitude: 2 V   Pulse width: 0.4 ms   Sensitivity: 0.3 mV     Ventricular - Amplitude: 2.5 V  Pulse width: 0.4 ms  Sensitivity: 0.9 mV      Changes made: RV and Atrial acute phase remaining OFF; Atrial amplitude 2V; RV amplitude 2.5V    Conclusions: Normal Pacemaker Function, Stable Pacing and Sensing Thresholds, and Adequate Battery Reserve    Remote Monitor:  Yes, connected.  Uses [facility]    Follow up: Every 3 months via [employer], annually in office.       Final impression:  Data above reviewed.  Agree with findings and conclusions as per the above note.

## 2024-11-15 PROBLEM — Z95.0 PRESENCE OF LEADLESS CARDIAC PACEMAKER: Status: ACTIVE | Noted: 2024-11-15

## 2025-01-06 ENCOUNTER — TELEPHONE (OUTPATIENT)
Dept: CARDIOLOGY | Facility: CLINIC | Age: 80
End: 2025-01-06
Payer: MEDICARE

## 2025-01-06 NOTE — TELEPHONE ENCOUNTER
----- Message from Bianca Espinal sent at 1/3/2025 12:25 PM CST -----  Lets have him come in to review the episode on December 28 to see if it looks like it was real atrial fibrillation or not.  We should also shorten his PVARP as he is blocking PACs which is causing then unnecessary atrial pacing that may increase his likelihood of atrial fibrillation.    Thanks,  Bianca

## 2025-01-13 ENCOUNTER — TELEPHONE (OUTPATIENT)
Dept: CARDIOLOGY | Facility: CLINIC | Age: 80
End: 2025-01-13
Payer: MEDICARE

## 2025-01-13 NOTE — TELEPHONE ENCOUNTER
Caller: Marissa Thomas    Relationship: Emergency Contact    Best call back number: [phone]        Who are you requesting to speak with (clinical staff, provider,  specific staff member): CLINICAL       What was the call regarding: PATIENT'S EMERGENCY CONTACT CALLED BACK TO FOLLOW UP ON FRIDAYS MESSAGE ABOUT DEVICE READING AND OFFICE WANTING TO SEE PATIENT SOON. PLEASE CALL BACK ASAP.

## 2025-01-13 NOTE — TELEPHONE ENCOUNTER
RN spoke to patient's wife, Carrie.  Per Carrie, it takes approximately 1-1.5 hours for them to travel to [facility].  RN will reach out to Dr. Espinal to see if patient can be added to his clinic schedule this week, in addition to device clinic schedule for reprogramming.  RN will contact patient with appointment dates/times after discussing with MD.

## 2025-02-14 ENCOUNTER — OFFICE VISIT (OUTPATIENT)
Dept: CARDIOLOGY | Facility: CLINIC | Age: 80
End: 2025-02-14
Payer: MEDICARE

## 2025-02-14 VITALS
DIASTOLIC BLOOD PRESSURE: 80 MMHG | HEART RATE: 84 BPM | WEIGHT: 242 LBS | BODY MASS INDEX: 32.78 KG/M2 | SYSTOLIC BLOOD PRESSURE: 132 MMHG | HEIGHT: 72 IN | OXYGEN SATURATION: 96 %

## 2025-02-14 DIAGNOSIS — I44.2 COMPLETE HEART BLOCK: Primary | ICD-10-CM

## 2025-02-14 DIAGNOSIS — I49.1 PREMATURE ATRIAL CONTRACTIONS: ICD-10-CM

## 2025-02-14 RX ORDER — CARVEDILOL 12.5 MG/1
12.5 TABLET ORAL 2 TIMES DAILY
Qty: 60 TABLET | Refills: 11 | Status: SHIPPED | OUTPATIENT
Start: 2025-02-14

## 2025-02-14 NOTE — PATIENT INSTRUCTIONS
1.  Start carvedilol 12.5mg twice daily  2.  Call us if your symptoms worsen  3.  3 month follow up

## 2025-02-15 NOTE — PROGRESS NOTES
"Chief Complaint  Atrial Fibrillation    Subjective        History of Present Illness    EP Problems:  1.  Complete heart block  2.  Sinus node dysfunction  3.  Frequent PACs     Cardiology Problems:  1.  Renal artery aneurysm  2.  CAD status post PCI  3.  Hypertension  4.  Hyperlipidemia     Medical Problems:  1.  BPH  2.  Hypothyroidism  3.  COPD  4.  Obesity     History of Present Illness  The patient is a 79-year-old male presenting to the clinic for follow-up of complete heart block and frequent atrial ectopy.  He endorses chronic fatigue and weakness.  He thought that he would be feeling better following a permanent pacemaker implant and he currently is.      Objective   Vital Signs:  /80   Pulse 84   Ht 182.9 cm (72.01\")   Wt 110 kg (242 lb)   SpO2 96%   BMI 32.81 kg/m²   Estimated body mass index is 32.81 kg/m² as calculated from the following:    Height as of this encounter: 182.9 cm (72.01\").    Weight as of this encounter: 110 kg (242 lb).      Physical Exam  Vitals reviewed.   Constitutional:       Appearance: He is obese.   Cardiovascular:      Rate and Rhythm: Normal rate and regular rhythm.      Pulses: Normal pulses.      Heart sounds: Normal heart sounds.      Comments: Pulse generator site is clean, dry, intact  Pulmonary:      Effort: Pulmonary effort is normal.      Breath sounds: Normal breath sounds.   Musculoskeletal:         General: No swelling.   Neurological:      Mental Status: He is alert.   Psychiatric:         Mood and Affect: Mood normal.         Judgment: Judgment normal.          Physical Exam      Result Review :  The following data was reviewed by: Bianca Espinal MD on today's date:    ECG today shows atrial paced rhythm with frequent atrial ectopy, ventricular paced rhythm with either PVCs or possible native conduction intermittently occurring             Assessment and Plan   Diagnoses and all orders for this visit:    1. Complete heart block (Primary)    2. Premature " atrial contractions    Other orders  -     carvedilol (COREG) 12.5 MG tablet; Take 1 tablet by mouth 2 (Two) Times a Day.  Dispense: 60 tablet; Refill: 11      He is still having extremely frequent atrial ectopy on his device.  This is making AV synchrony extremely challenging.  Will plan to start him on carvedilol today to try to reduce his atrial ectopy burden.  He was advised to stop the carvedilol if it caused him to feel worse.  Will otherwise plan to follow-up in 3 months and see how he is doing with this intervention.    Plan:  -Start carvedilol 12.5 mg twice daily for frequent PACs  -Continue remote follow-up of permanent pacemaker for complete heart block  -3-month follow-up in clinic to reassess symptoms following medication changes  -Can consider initiation of antiarrhythmic medications if necessary  -Long-term follow-up in our clinic for complete heart block, presence of cardiac pacemaker, frequent PACs    I spent 50 minutes caring for Ej Almanza on this date of service (excluding time spent on ECG interpretation and documentation). This time includes time spent by me in the following activities:  preparing for the visit, reviewing tests, obtaining and/or reviewing a separately obtained history, performing a medically appropriate examination and/or evaluation, counseling and educating the patient/family/caregiver, and documenting information in the medical record.          Follow Up   Return in about 3 months (around 5/14/2025).  Patient was given instructions and counseling regarding his condition or for health maintenance advice. Please see specific information pulled into the AVS if appropriate.     Part of this note may be an electronic transcription/translation of spoken language to printed text using the Dragon Dictation System.    Patient or patient representative verbalized consent for the use of Ambient Listening during the visit with  Bianca Espinal MD for chart documentation. 2/14/2025   18:31 CST

## 2025-02-21 ENCOUNTER — TELEPHONE (OUTPATIENT)
Dept: CARDIOLOGY | Facility: CLINIC | Age: 80
End: 2025-02-21

## 2025-02-21 NOTE — TELEPHONE ENCOUNTER
Caller: Ej Almanza    Relationship: Self    Best call back number: [phone]    Which medication are you concerned about: CARVEDILOL    Who prescribed you this medication: DR. BURNS    When did you start taking this medication: 2-14-25    What are your concerns: DIZZY, DIARRHEA, AND SLEEPY    How long have you had these concerns: HE STOPPED TAKING 2-18-25 HIS BP WAS UP AND HE TOOK ANOTHER LAST NIGHT AT 6PM WITH THE SAME CONCERNS.  HE WOULD LIKE TO KNOW WHAT HE SHOULD DO?

## 2025-02-22 RX ORDER — FLECAINIDE ACETATE 50 MG/1
50 TABLET ORAL 2 TIMES DAILY
Qty: 60 TABLET | Refills: 11 | Status: SHIPPED | OUTPATIENT
Start: 2025-02-22

## 2025-06-26 ENCOUNTER — OFFICE VISIT (OUTPATIENT)
Dept: CARDIOLOGY | Facility: CLINIC | Age: 80
End: 2025-06-26
Payer: MEDICARE

## 2025-06-26 VITALS
DIASTOLIC BLOOD PRESSURE: 80 MMHG | HEART RATE: 85 BPM | BODY MASS INDEX: 31.02 KG/M2 | SYSTOLIC BLOOD PRESSURE: 110 MMHG | WEIGHT: 229 LBS | OXYGEN SATURATION: 95 % | HEIGHT: 72 IN

## 2025-06-26 DIAGNOSIS — Z95.0 PACEMAKER: ICD-10-CM

## 2025-06-26 DIAGNOSIS — I44.2 COMPLETE HEART BLOCK: Primary | ICD-10-CM

## 2025-06-26 DIAGNOSIS — I49.1 PREMATURE ATRIAL CONTRACTIONS: ICD-10-CM

## 2025-06-26 PROCEDURE — 93000 ELECTROCARDIOGRAM COMPLETE: CPT

## 2025-06-26 PROCEDURE — 99214 OFFICE O/P EST MOD 30 MIN: CPT

## 2025-06-26 RX ORDER — EZETIMIBE 10 MG/1
10 TABLET ORAL DAILY
COMMUNITY

## 2025-06-26 NOTE — PROGRESS NOTES
[facility] Electrophysiology   Reason For Visit:  Atrial Fibrillation     Subjective        EP Problems:  1.  Complete heart block  -10/1/2024: Dual-chamber PPM implant  2.  Sinus node dysfunction  3.  Frequent PACs     Cardiology Problems:  1.  Renal artery aneurysm  2.  CAD status post PCI  3.  Hypertension  4.  Hyperlipidemia     Medical Problems:  1.  BPH  2.  Hypothyroidism  3.  COPD  4.  Obesity       Ej Almanza is a 79 y.o. male with above pertinent PMH who presents for follow-up of complete heart block and frequent PACs.    Patient last seen in February 2025.  Was feeling very fatigued at that time.  Noted to have frequent PACs on pacemaker.  Was started on carvedilol for PAC suppression.    The patient was unsure about ever starting carvedilol.  I called his pharmacy and it appears he got the medication for 1 month and then did not get any more refills.  Regardless, the patient feels that he is doing better.  Does not notice any significant heart racing or fluttering.  Feels that his fatigue has improved as well.    ROS: Pertinent findings as noted above        Pertinent past medical, surgical, family, and social history were reviewed.      Current Outpatient Medications:     acetaminophen (TYLENOL) 500 MG tablet, Take 1 tablet by mouth Daily As Needed for Mild Pain., Disp: , Rfl:     aspirin 81 MG chewable tablet, Chew 1 tablet Daily., Disp: , Rfl:     Boswellia-Glucosamine-Vit D (OSTEO BI-FLEX ONE PER DAY PO), Take 1 tablet by mouth Daily., Disp: , Rfl:     budesonide (PULMICORT) 0.5 MG/2ML nebulizer solution, Take 2 mL by nebulization 2 (Two) Times a Day., Disp: , Rfl:     Cholecalciferol 25 MCG (1000 UT) tablet, Take 2 tablets by mouth Daily., Disp: , Rfl:     Diclofenac Sodium (VOLTAREN) 1 % gel gel, Apply 4 g topically to the appropriate area as directed 2 (Two) Times a Day., Disp: , Rfl:     ezetimibe (ZETIA) 10 MG tablet, Take 1 tablet by mouth Daily., Disp: , Rfl:      "hydroCHLOROthiazide 12.5 MG tablet, Take 1 tablet by mouth Daily., Disp: , Rfl:     levothyroxine (SYNTHROID, LEVOTHROID) 25 MCG tablet, Take 1 tablet by mouth Every Morning., Disp: , Rfl:     losartan (COZAAR) 25 MG tablet, Take 1 tablet by mouth Daily., Disp: , Rfl:     tamsulosin (FLOMAX) 0.4 MG capsule 24 hr capsule, Take 1 capsule by mouth Every Other Day., Disp: , Rfl:      Objective   Vital Signs:  /80   Pulse 85   Ht 182.9 cm (72.01\")   Wt 104 kg (229 lb)   SpO2 95%   BMI 31.05 kg/m²   Estimated body mass index is 31.05 kg/m² as calculated from the following:    Height as of this encounter: 182.9 cm (72.01\").    Weight as of this encounter: 104 kg (229 lb).      Constitutional:       Appearance: Healthy appearance. Not in distress.   Pulmonary:      Effort: Pulmonary effort is normal.      Breath sounds: Normal breath sounds and air entry.   Cardiovascular:      PMI at left midclavicular line. Normal rate. Regular rhythm. Normal S1. Normal S2.       Murmurs: There is no murmur.      No gallop.  No click. No rub.   Pulses:     Intact distal pulses.   Edema:     Peripheral edema absent.   Neurological:      Mental Status: Alert and oriented to person, place and time.        Result Review :  The following data was reviewed by: RADHA Novoa on 06/26/2025:  CMP   CMP          9/30/2024    13:21 10/1/2024    08:28 10/1/2024    14:54 10/4/2024    14:23   CMP   Glucose 99  97  100  89    BUN 17  16  16  17    Creatinine 0.83  0.76  0.74  0.89    EGFR 90.1  92.6  93.3  88.3    Sodium 140  141  140  139    Potassium 4.1  4.2  4.0  4.2    Chloride 104  105  105  103    Calcium 9.4  8.8  8.7  9.1    Total Protein 7.1  6.5   7.0    Albumin 4.0  3.7   3.7    Globulin 3.1  2.8   3.3    Total Bilirubin 0.6  0.8   0.5    Alkaline Phosphatase 73  71   85    AST (SGOT) 22  20   21    ALT (SGPT) 22  19   18    Albumin/Globulin Ratio 1.3  1.3   1.1    BUN/Creatinine Ratio 20.5  21.1  21.6  19.1    Anion Gap " 10.0  11.0  13.0  10.0      CBC   CBC          9/30/2024    13:21 10/1/2024    08:28 10/1/2024    14:54 10/4/2024    14:23   CBC   WBC 8.61  8.35  8.39  8.25    RBC 4.86  4.76  4.88  4.81    Hemoglobin 15.2  14.6  15.3  15.0    Hematocrit 45.0  43.5  44.6  44.6    MCV 92.6  91.4  91.4  92.7    MCH 31.3  30.7  31.4  31.2    MCHC 33.8  33.6  34.3  33.6    RDW 14.8  14.7  14.8  15.0    Platelets 144  136  137  131           ECG 12 Lead    Date/Time: 6/26/2025 12:08 PM  Performed by: Saad Madrid APRN    Authorized by: Saad Madrid APRN  Comparison: compared with previous ECG from 2/14/2025  Similar to previous ECG  Comparison to previous ECG: Atrial paced rhythm  Rhythm: paced  Rate: normal  QRS axis: normal    Clinical impression: abnormal EKG            Assessment and Plan   Diagnoses and all orders for this visit:    1. Complete heart block (Primary)  2. Premature atrial contractions  3. Pacemaker  -Atrial paced on EKG today  - It appears patient took beta-blocker for 1 month and did not get refills, however irregardless he feels like he is doing better today.  Will not resume at this time.  - Will continue to follow pacemaker transmissions and resume beta-blocker if necessary  - Follow-up in the EP clinic at time of next in office device check                     I spent 2 minutes on the separately reported service of EKG interpretation. This time is not included in the time used to support the E/M service also reported today.      Follow Up   Return in about 5 months (around 11/18/2025).  Patient was given instructions and counseling regarding his condition or for health maintenance advice. Please see specific information pulled into the AVS if appropriate.       Part of this note may be an electronic transcription/translation of spoken language to printed text using the Dragon Dictation System.

## 2025-07-15 LAB
MC_CV_MDC_IDC_RATE_1: 150
MC_CV_MDC_IDC_RATE_1: 171
MC_CV_MDC_IDC_THERAPIES: NORMAL
MC_CV_MDC_IDC_ZONE_ID: 2
MC_CV_MDC_IDC_ZONE_ID: 6
MDC_IDC_MSMT_BATTERY_REMAINING_LONGEVITY: 127 MO
MDC_IDC_MSMT_BATTERY_RRT_TRIGGER: 2.62
MDC_IDC_MSMT_BATTERY_STATUS: NORMAL
MDC_IDC_MSMT_BATTERY_VOLTAGE: 3.06
MDC_IDC_MSMT_LEADCHNL_RA_DTM: NORMAL
MDC_IDC_MSMT_LEADCHNL_RA_IMPEDANCE_VALUE: 475
MDC_IDC_MSMT_LEADCHNL_RA_PACING_THRESHOLD_AMPLITUDE: 0.88
MDC_IDC_MSMT_LEADCHNL_RA_PACING_THRESHOLD_POLARITY: NORMAL
MDC_IDC_MSMT_LEADCHNL_RA_PACING_THRESHOLD_PULSEWIDTH: 0.4
MDC_IDC_MSMT_LEADCHNL_RA_SENSING_INTR_AMPL: 1.75
MDC_IDC_MSMT_LEADCHNL_RV_DTM: NORMAL
MDC_IDC_MSMT_LEADCHNL_RV_IMPEDANCE_VALUE: 589
MDC_IDC_MSMT_LEADCHNL_RV_PACING_THRESHOLD_AMPLITUDE: 1.12
MDC_IDC_MSMT_LEADCHNL_RV_PACING_THRESHOLD_POLARITY: NORMAL
MDC_IDC_MSMT_LEADCHNL_RV_PACING_THRESHOLD_PULSEWIDTH: 0.4
MDC_IDC_MSMT_LEADCHNL_RV_SENSING_INTR_AMPL: 31.62
MDC_IDC_PG_IMPLANT_DTM: NORMAL
MDC_IDC_PG_MFG: NORMAL
MDC_IDC_PG_MODEL: NORMAL
MDC_IDC_PG_SERIAL: NORMAL
MDC_IDC_PG_TYPE: NORMAL
MDC_IDC_SESS_DTM: NORMAL
MDC_IDC_SESS_TYPE: NORMAL
MDC_IDC_SET_BRADY_AT_MODE_SWITCH_RATE: 171
MDC_IDC_SET_BRADY_LOWRATE: 60
MDC_IDC_SET_BRADY_MAX_SENSOR_RATE: 130
MDC_IDC_SET_BRADY_MAX_TRACKING_RATE: 110
MDC_IDC_SET_BRADY_MODE: NORMAL
MDC_IDC_SET_BRADY_PAV_DELAY: 180
MDC_IDC_SET_BRADY_SAV_DELAY: 150
MDC_IDC_SET_LEADCHNL_RA_PACING_AMPLITUDE: 1.75
MDC_IDC_SET_LEADCHNL_RA_PACING_POLARITY: NORMAL
MDC_IDC_SET_LEADCHNL_RA_PACING_PULSEWIDTH: 0.4
MDC_IDC_SET_LEADCHNL_RA_SENSING_POLARITY: NORMAL
MDC_IDC_SET_LEADCHNL_RA_SENSING_SENSITIVITY: 0.3
MDC_IDC_SET_LEADCHNL_RV_PACING_AMPLITUDE: 2.5
MDC_IDC_SET_LEADCHNL_RV_PACING_POLARITY: NORMAL
MDC_IDC_SET_LEADCHNL_RV_PACING_PULSEWIDTH: 0.4
MDC_IDC_SET_LEADCHNL_RV_SENSING_POLARITY: NORMAL
MDC_IDC_SET_LEADCHNL_RV_SENSING_SENSITIVITY: 0.9
MDC_IDC_SET_ZONE_STATUS: NORMAL
MDC_IDC_SET_ZONE_STATUS: NORMAL
MDC_IDC_SET_ZONE_TYPE: NORMAL
MDC_IDC_SET_ZONE_TYPE: NORMAL
MDC_IDC_STAT_AT_BURDEN_PERCENT: 0
MDC_IDC_STAT_BRADY_RA_PERCENT_PACED: 54.8
MDC_IDC_STAT_BRADY_RV_PERCENT_PACED: 99.76

## (undated) DEVICE — SOL IRR NACL 0.9PCT BT 1000ML

## (undated) DEVICE — CANN NASL ETCO2 LO/FLO A/

## (undated) DEVICE — 450 ML BOTTLE OF 0.05% CHLORHEXIDINE GLUCONATE IN 99.95% STERILE WATER FOR IRRIGATION, USP AND APPLICATOR.: Brand: IRRISEPT ANTIMICROBIAL WOUND LAVAGE

## (undated) DEVICE — STRIP,CLOSURE,WOUND,MEDI-STRIP,1/2X4: Brand: MEDLINE

## (undated) DEVICE — PAD, DEFIB, ADULT, RADIOTRANS, PHYSIO: Brand: MEDLINE

## (undated) DEVICE — CATH GUIDE RIGHTSITE C315HIS-02

## (undated) DEVICE — SHEATH INTRO FAST/CATH STD .038 18G 6F 12CM

## (undated) DEVICE — INTRO TEAR AWAY/LVD W/SD PRT 7F 13CM

## (undated) DEVICE — Device: Brand: MEDICAL ACTION INDUSTRIES

## (undated) DEVICE — DISPOSABLE ADAPTER

## (undated) DEVICE — DRSNG WND GZ PAD BORDERED LF 4X5IN STRL

## (undated) DEVICE — BIOPATCH™ ANTIMICROBIAL DRESSING WITH CHLORHEXIDINE GLUCONATE IS A HYDROPHILLIC POLYURETHANE ABSORPTIVE FOAM WITH CHLORHEXIDINE GLUCONATE (CHG) WHICH INHIBITS BACTERIAL GROWTH UNDER THE DRESSING. THE DRESSING IS INTENDED TO BE USED TO ABSORB EXUDATE, COVER A WOUND CAUSED BY VASCULAR AND NONVASCULAR PERCUTANEOUS MEDICAL DEVICES DURING SURGERY, AS WELL AS REDUCE LOCAL INFECTION AND COLONIZATION OF MICROORGANISMS.: Brand: BIOPATCH

## (undated) DEVICE — DRAPE,ANGIO,BRACH,STERILE,38X44: Brand: MEDLINE

## (undated) DEVICE — PCH SURG INVISISHIELD FLD/COL W/DRN/PRT 20X6IN

## (undated) DEVICE — CATH ELECTRD PACE TEMP BI NONHEP 5F110CM

## (undated) DEVICE — PK CATH CARD 30 CA/4

## (undated) DEVICE — SOLIDIFIER LIQ LIQUILOC/PLUS W/TREAT 2000CC

## (undated) DEVICE — CATH ELECTRD PACE TEMP BIPOL SS 5F 115CM

## (undated) DEVICE — KT NDL GUIDE STRL 18GA

## (undated) DEVICE — SLITTER CATH GUIDE ATTAIN ADJ

## (undated) DEVICE — CABL BIPOL W/ALLGTR CLIP/SM 12FT